# Patient Record
Sex: MALE | Race: WHITE | NOT HISPANIC OR LATINO | Employment: UNEMPLOYED | URBAN - METROPOLITAN AREA
[De-identification: names, ages, dates, MRNs, and addresses within clinical notes are randomized per-mention and may not be internally consistent; named-entity substitution may affect disease eponyms.]

---

## 2018-07-26 ENCOUNTER — HOSPITAL ENCOUNTER (EMERGENCY)
Facility: HOSPITAL | Age: 43
Discharge: HOME | End: 2018-07-26
Attending: EMERGENCY MEDICINE
Payer: COMMERCIAL

## 2018-07-26 VITALS
OXYGEN SATURATION: 96 % | RESPIRATION RATE: 17 BRPM | SYSTOLIC BLOOD PRESSURE: 108 MMHG | HEIGHT: 69 IN | DIASTOLIC BLOOD PRESSURE: 78 MMHG | BODY MASS INDEX: 26.66 KG/M2 | WEIGHT: 180 LBS | TEMPERATURE: 99 F | HEART RATE: 85 BPM

## 2018-07-26 DIAGNOSIS — S82.832A CLOSED AVULSION FRACTURE OF DISTAL END OF LEFT FIBULA, INITIAL ENCOUNTER: Primary | ICD-10-CM

## 2018-07-26 DIAGNOSIS — M25.572 LEFT ANKLE PAIN: ICD-10-CM

## 2018-07-26 PROCEDURE — 99283 EMERGENCY DEPT VISIT LOW MDM: CPT | Mod: 25

## 2018-07-26 PROCEDURE — 29515 APPLICATION SHORT LEG SPLINT: CPT | Mod: LT

## 2018-07-26 PROCEDURE — 99283 EMERGENCY DEPT VISIT LOW MDM: CPT | Mod: ,,, | Performed by: EMERGENCY MEDICINE

## 2018-07-27 NOTE — ED NOTES
Pt alert, oriented, and stable for discharge. Pt discharged with family as transportation home. Discharge instructions and follow up care reviewed with patient.

## 2018-07-27 NOTE — ED PROVIDER NOTES
"Encounter Date: 7/26/2018    SCRIBE #1 NOTE: I, Lala Clay, am scribing for, and in the presence of,  Dr. Oquendo. I have scribed the following portions of the note - Other sections scribed: HPI, ROS, PE.       History     Chief Complaint   Patient presents with    Ankle Pain     Pt reports tripping and "rolling" left ankle. Swelling noted to left ankle.     Time patient was seen by the provider: 8:59 PM      The patient is a 43 y.o. male who presents to the ED with a complaint of left ankle pain after he rolled his ankle while jogging with his dog earlier today. This is an inversion type injury. Patient states pain worsens with movement. There is swelling noted to his ankle. Denies taking anything for the pain.         The history is provided by the patient and medical records.     Review of patient's allergies indicates:  No Known Allergies  No past medical history on file.  No past surgical history on file.  No family history on file.  Social History   Substance Use Topics    Smoking status: Not on file    Smokeless tobacco: Not on file    Alcohol use Not on file     Review of Systems   Constitutional: Negative.    HENT: Negative.    Eyes: Negative.    Respiratory: Negative.    Cardiovascular: Negative.    Gastrointestinal: Negative.    Endocrine: Negative.    Genitourinary: Negative.    Musculoskeletal: Positive for joint swelling (left ankle).        (+) Left ankle pain    Allergic/Immunologic: Negative.    Neurological: Negative.    Hematological: Negative.    Psychiatric/Behavioral: Negative.    All other systems reviewed and are negative.      Physical Exam     Initial Vitals [07/26/18 2044]   BP Pulse Resp Temp SpO2   108/78 85 17 98.6 °F (37 °C) 96 %      MAP       --         Physical Exam    Nursing note and vitals reviewed.  Constitutional: He appears well-developed and well-nourished. No distress.   HENT:   Head: Normocephalic and atraumatic.   Eyes: Conjunctivae are normal.   Neck: Normal " range of motion. Neck supple.   Cardiovascular: Normal rate, regular rhythm and normal heart sounds.   Pulmonary/Chest: Breath sounds normal. No respiratory distress.   Abdominal: Soft. Bowel sounds are normal. He exhibits no distension. There is no tenderness.   Musculoskeletal: Normal range of motion.   Tenderness to left distal fibula. Mild edema over the anterior fibular region. No fibular head tenderness.    Neurological: He is alert and oriented to person, place, and time. He has normal strength. No sensory deficit.   Skin: Skin is warm and dry. Capillary refill takes less than 2 seconds.   Psychiatric: He has a normal mood and affect.         ED Course   Procedures  Labs Reviewed - No data to display       Imaging Results          X-Ray Ankle Complete Left (Final result)  Result time 07/26/18 22:22:21    Final result by Jose Lang MD (07/26/18 22:22:21)                 Impression:      Small avulsion fracture at the lateral aspect of the talus/inferior aspect of the lateral malleolus with overlying soft tissue swelling.      Electronically signed by: Jose Lang MD  Date:    07/26/2018  Time:    22:22             Narrative:    EXAMINATION:  XR ANKLE COMPLETE 3 VIEW LEFT    CLINICAL HISTORY:  Pain in left ankle and joints of left foot    TECHNIQUE:  Three views of the left ankle.    COMPARISON:  None.    FINDINGS:  There is a small bone fragment at the lateral aspect of the talus/inferior aspect of the lateral malleolus, likely an avulsion fracture.  No additional fractures are identified.  Ankle mortise is intact.  Moderate soft tissue swelling about the ankle, more pronounced over the lateral malleolus.  No radiopaque foreign body.                                 Medical Decision Making:   History:   Old Medical Records: I decided to obtain old medical records.  Clinical Tests:   Radiological Study: Ordered and Reviewed  ED Management:  Will refer to ortho clinic and apply walking boot. Small  avulsion fx. Declined motrin or pain meds.     I, Dr. Jesus Oquendo, personally performed the services described in this documentation. All medical record entries made by the scribe were at my direction and in my presence.  I have reviewed the chart and agree that the record reflects my personal performance and is accurate and complete. Jesus Oquendo MD.  10:29 PM 07/26/2018              Scribe Attestation:   Scribe #1: I performed the above scribed service and the documentation accurately describes the services I performed. I attest to the accuracy of the note.               Clinical Impression:   The primary encounter diagnosis was Closed avulsion fracture of distal end of left fibula, initial encounter. A diagnosis of Left ankle pain was also pertinent to this visit.      Disposition:   Disposition: Discharged  Condition: Stable                        Jesus Oquendo MD  07/26/18 2230       Jesus Oquendo MD  07/26/18 2230

## 2018-07-27 NOTE — ED NOTES
"Chief Complaint   Patient presents with    Ankle Pain     Pt reports tripping and "rolling" left ankle. Swelling noted to left ankle.     Pt presents to ED with c/o left ankle pain after running with his dog and stepped on a "magnolia seed." Pt has no visible deformity, but has visible swelling to left ankle. Pt has +ROM, but c/o pain with movement. No discoloration present. Pt denies taking anything for pain PTA and states "and I probably won't."     Active Ambulatory Problems     Diagnosis Date Noted    No Active Ambulatory Problems     Resolved Ambulatory Problems     Diagnosis Date Noted    No Resolved Ambulatory Problems     No Additional Past Medical History     Review of patient's allergies indicates:  No Known Allergies  "

## 2022-01-20 ENCOUNTER — HOSPITAL ENCOUNTER (OUTPATIENT)
Facility: HOSPITAL | Age: 47
Discharge: HOME | End: 2022-01-22
Attending: EMERGENCY MEDICINE | Admitting: STUDENT IN AN ORGANIZED HEALTH CARE EDUCATION/TRAINING PROGRAM
Payer: MEDICAID

## 2022-01-20 DIAGNOSIS — L98.9 SKIN LESION: ICD-10-CM

## 2022-01-20 DIAGNOSIS — R07.9 CHEST PAIN: ICD-10-CM

## 2022-01-20 DIAGNOSIS — R73.9 HYPERGLYCEMIA: ICD-10-CM

## 2022-01-20 DIAGNOSIS — L30.8 OTHER ECZEMA: ICD-10-CM

## 2022-01-20 DIAGNOSIS — I88.9 LYMPHADENITIS: Primary | ICD-10-CM

## 2022-01-20 PROBLEM — L03.113 CELLULITIS OF RIGHT UPPER EXTREMITY: Status: ACTIVE | Noted: 2022-01-20

## 2022-01-20 LAB
BASOPHILS # BLD AUTO: 0.06 K/UL (ref 0–0.2)
BASOPHILS NFR BLD: 1.4 % (ref 0–1.9)
BUN SERPL-MCNC: 14 MG/DL (ref 6–30)
CHLORIDE SERPL-SCNC: 102 MMOL/L (ref 95–110)
CREAT SERPL-MCNC: 1 MG/DL (ref 0.5–1.4)
CTP QC/QA: YES
DIFFERENTIAL METHOD: NORMAL
EOSINOPHIL # BLD AUTO: 0.1 K/UL (ref 0–0.5)
EOSINOPHIL NFR BLD: 1.4 % (ref 0–8)
ERYTHROCYTE [DISTWIDTH] IN BLOOD BY AUTOMATED COUNT: 12.9 % (ref 11.5–14.5)
ERYTHROCYTE [SEDIMENTATION RATE] IN BLOOD BY WESTERGREN METHOD: 4 MM/HR (ref 0–23)
GLUCOSE SERPL-MCNC: 110 MG/DL (ref 70–110)
HCT VFR BLD AUTO: 47.7 % (ref 40–54)
HCT VFR BLD CALC: 47 %PCV (ref 36–54)
HGB BLD-MCNC: 15.8 G/DL (ref 14–18)
IMM GRANULOCYTES # BLD AUTO: 0.02 K/UL (ref 0–0.04)
IMM GRANULOCYTES NFR BLD AUTO: 0.5 % (ref 0–0.5)
LACTATE SERPL-SCNC: 0.7 MMOL/L (ref 0.5–2.2)
LYMPHOCYTES # BLD AUTO: 1.5 K/UL (ref 1–4.8)
LYMPHOCYTES NFR BLD: 34.7 % (ref 18–48)
MCH RBC QN AUTO: 29.5 PG (ref 27–31)
MCHC RBC AUTO-ENTMCNC: 33.1 G/DL (ref 32–36)
MCV RBC AUTO: 89 FL (ref 82–98)
MONOCYTES # BLD AUTO: 0.5 K/UL (ref 0.3–1)
MONOCYTES NFR BLD: 11.7 % (ref 4–15)
NEUTROPHILS # BLD AUTO: 2.2 K/UL (ref 1.8–7.7)
NEUTROPHILS NFR BLD: 50.3 % (ref 38–73)
NRBC BLD-RTO: 0 /100 WBC
PLATELET # BLD AUTO: 209 K/UL (ref 150–450)
PMV BLD AUTO: 11.3 FL (ref 9.2–12.9)
POC IONIZED CALCIUM: 1.16 MMOL/L (ref 1.06–1.42)
POC TCO2 (MEASURED): 25 MMOL/L (ref 23–29)
POTASSIUM BLD-SCNC: 3.7 MMOL/L (ref 3.5–5.1)
RBC # BLD AUTO: 5.36 M/UL (ref 4.6–6.2)
SAMPLE: NORMAL
SARS-COV-2 RDRP RESP QL NAA+PROBE: NEGATIVE
SODIUM BLD-SCNC: 140 MMOL/L (ref 136–145)
WBC # BLD AUTO: 4.27 K/UL (ref 3.9–12.7)

## 2022-01-20 PROCEDURE — 80047 BASIC METABLC PNL IONIZED CA: CPT

## 2022-01-20 PROCEDURE — 96375 TX/PRO/DX INJ NEW DRUG ADDON: CPT

## 2022-01-20 PROCEDURE — 85652 RBC SED RATE AUTOMATED: CPT | Performed by: PHYSICIAN ASSISTANT

## 2022-01-20 PROCEDURE — U0002 COVID-19 LAB TEST NON-CDC: HCPCS | Performed by: PHYSICIAN ASSISTANT

## 2022-01-20 PROCEDURE — 86140 C-REACTIVE PROTEIN: CPT | Performed by: PHYSICIAN ASSISTANT

## 2022-01-20 PROCEDURE — 83605 ASSAY OF LACTIC ACID: CPT | Performed by: PHYSICIAN ASSISTANT

## 2022-01-20 PROCEDURE — 99285 EMERGENCY DEPT VISIT HI MDM: CPT | Mod: 25

## 2022-01-20 PROCEDURE — 99285 EMERGENCY DEPT VISIT HI MDM: CPT | Mod: CS,,, | Performed by: PHYSICIAN ASSISTANT

## 2022-01-20 PROCEDURE — G0378 HOSPITAL OBSERVATION PER HR: HCPCS

## 2022-01-20 PROCEDURE — 63600175 PHARM REV CODE 636 W HCPCS: Performed by: PHYSICIAN ASSISTANT

## 2022-01-20 PROCEDURE — 96365 THER/PROPH/DIAG IV INF INIT: CPT

## 2022-01-20 PROCEDURE — 85025 COMPLETE CBC W/AUTO DIFF WBC: CPT | Performed by: PHYSICIAN ASSISTANT

## 2022-01-20 PROCEDURE — 87040 BLOOD CULTURE FOR BACTERIA: CPT | Mod: 59 | Performed by: PHYSICIAN ASSISTANT

## 2022-01-20 PROCEDURE — 99285 PR EMERGENCY DEPT VISIT,LEVEL V: ICD-10-PCS | Mod: CS,,, | Performed by: PHYSICIAN ASSISTANT

## 2022-01-20 PROCEDURE — 96367 TX/PROPH/DG ADDL SEQ IV INF: CPT

## 2022-01-20 PROCEDURE — 25000003 PHARM REV CODE 250: Performed by: PHYSICIAN ASSISTANT

## 2022-01-20 PROCEDURE — 80053 COMPREHEN METABOLIC PANEL: CPT | Performed by: PHYSICIAN ASSISTANT

## 2022-01-20 PROCEDURE — 83036 HEMOGLOBIN GLYCOSYLATED A1C: CPT | Performed by: PHYSICIAN ASSISTANT

## 2022-01-20 RX ORDER — NALOXONE HCL 0.4 MG/ML
0.02 VIAL (ML) INJECTION
Status: DISCONTINUED | OUTPATIENT
Start: 2022-01-21 | End: 2022-01-22 | Stop reason: HOSPADM

## 2022-01-20 RX ORDER — ENOXAPARIN SODIUM 100 MG/ML
40 INJECTION SUBCUTANEOUS EVERY 24 HOURS
Status: DISCONTINUED | OUTPATIENT
Start: 2022-01-21 | End: 2022-01-22 | Stop reason: HOSPADM

## 2022-01-20 RX ORDER — GLUCAGON 1 MG
1 KIT INJECTION
Status: DISCONTINUED | OUTPATIENT
Start: 2022-01-21 | End: 2022-01-22 | Stop reason: HOSPADM

## 2022-01-20 RX ORDER — ACETAMINOPHEN 325 MG/1
650 TABLET ORAL EVERY 6 HOURS PRN
Status: DISCONTINUED | OUTPATIENT
Start: 2022-01-21 | End: 2022-01-22 | Stop reason: HOSPADM

## 2022-01-20 RX ORDER — TALC
6 POWDER (GRAM) TOPICAL NIGHTLY PRN
Status: DISCONTINUED | OUTPATIENT
Start: 2022-01-21 | End: 2022-01-22 | Stop reason: HOSPADM

## 2022-01-20 RX ORDER — KETOROLAC TROMETHAMINE 30 MG/ML
15 INJECTION, SOLUTION INTRAMUSCULAR; INTRAVENOUS EVERY 6 HOURS PRN
Status: DISCONTINUED | OUTPATIENT
Start: 2022-01-21 | End: 2022-01-22 | Stop reason: HOSPADM

## 2022-01-20 RX ORDER — HYDROCODONE BITARTRATE AND ACETAMINOPHEN 5; 325 MG/1; MG/1
1 TABLET ORAL EVERY 6 HOURS PRN
Status: DISCONTINUED | OUTPATIENT
Start: 2022-01-21 | End: 2022-01-20

## 2022-01-20 RX ORDER — IBUPROFEN 200 MG
24 TABLET ORAL
Status: DISCONTINUED | OUTPATIENT
Start: 2022-01-21 | End: 2022-01-22 | Stop reason: HOSPADM

## 2022-01-20 RX ORDER — ONDANSETRON 2 MG/ML
4 INJECTION INTRAMUSCULAR; INTRAVENOUS EVERY 8 HOURS PRN
Status: DISCONTINUED | OUTPATIENT
Start: 2022-01-21 | End: 2022-01-22 | Stop reason: HOSPADM

## 2022-01-20 RX ORDER — AMOXICILLIN 250 MG
1 CAPSULE ORAL DAILY
Status: DISCONTINUED | OUTPATIENT
Start: 2022-01-21 | End: 2022-01-22 | Stop reason: HOSPADM

## 2022-01-20 RX ORDER — SODIUM CHLORIDE 0.9 % (FLUSH) 0.9 %
10 SYRINGE (ML) INJECTION EVERY 12 HOURS PRN
Status: DISCONTINUED | OUTPATIENT
Start: 2022-01-21 | End: 2022-01-22 | Stop reason: HOSPADM

## 2022-01-20 RX ORDER — DIPHENHYDRAMINE HYDROCHLORIDE 50 MG/ML
25 INJECTION INTRAMUSCULAR; INTRAVENOUS
Status: COMPLETED | OUTPATIENT
Start: 2022-01-20 | End: 2022-01-20

## 2022-01-20 RX ORDER — IBUPROFEN 200 MG
16 TABLET ORAL
Status: DISCONTINUED | OUTPATIENT
Start: 2022-01-21 | End: 2022-01-22 | Stop reason: HOSPADM

## 2022-01-20 RX ADMIN — PIPERACILLIN AND TAZOBACTAM 4.5 G: 4; .5 INJECTION, POWDER, LYOPHILIZED, FOR SOLUTION INTRAVENOUS; PARENTERAL at 09:01

## 2022-01-20 RX ADMIN — DIPHENHYDRAMINE HYDROCHLORIDE 25 MG: 50 INJECTION INTRAMUSCULAR; INTRAVENOUS at 10:01

## 2022-01-20 RX ADMIN — VANCOMYCIN HYDROCHLORIDE 2000 MG: 500 INJECTION, POWDER, LYOPHILIZED, FOR SOLUTION INTRAVENOUS at 10:01

## 2022-01-21 PROBLEM — E66.9 CLASS 1 OBESITY IN ADULT: Status: ACTIVE | Noted: 2022-01-21

## 2022-01-21 PROBLEM — E66.811 CLASS 1 OBESITY IN ADULT: Status: ACTIVE | Noted: 2022-01-21

## 2022-01-21 PROBLEM — R73.9 HYPERGLYCEMIA: Status: ACTIVE | Noted: 2022-01-21

## 2022-01-21 LAB
ALBUMIN SERPL BCP-MCNC: 3.8 G/DL (ref 3.5–5.2)
ALP SERPL-CCNC: 61 U/L (ref 55–135)
ALT SERPL W/O P-5'-P-CCNC: 38 U/L (ref 10–44)
ANION GAP SERPL CALC-SCNC: 8 MMOL/L (ref 8–16)
AST SERPL-CCNC: 19 U/L (ref 10–40)
BILIRUB SERPL-MCNC: 0.6 MG/DL (ref 0.1–1)
BUN SERPL-MCNC: 13 MG/DL (ref 6–20)
CALCIUM SERPL-MCNC: 8.4 MG/DL (ref 8.7–10.5)
CHLORIDE SERPL-SCNC: 105 MMOL/L (ref 95–110)
CO2 SERPL-SCNC: 22 MMOL/L (ref 23–29)
CREAT SERPL-MCNC: 1.2 MG/DL (ref 0.5–1.4)
CRP SERPL-MCNC: 17.9 MG/L (ref 0–8.2)
EST. GFR  (AFRICAN AMERICAN): >60 ML/MIN/1.73 M^2
EST. GFR  (NON AFRICAN AMERICAN): >60 ML/MIN/1.73 M^2
ESTIMATED AVG GLUCOSE: 108 MG/DL (ref 68–131)
GLUCOSE SERPL-MCNC: 153 MG/DL (ref 70–110)
HBA1C MFR BLD: 5.4 % (ref 4–5.6)
POTASSIUM SERPL-SCNC: 3.8 MMOL/L (ref 3.5–5.1)
PROT SERPL-MCNC: 6.6 G/DL (ref 6–8.4)
SODIUM SERPL-SCNC: 135 MMOL/L (ref 136–145)

## 2022-01-21 PROCEDURE — 25000003 PHARM REV CODE 250: Performed by: STUDENT IN AN ORGANIZED HEALTH CARE EDUCATION/TRAINING PROGRAM

## 2022-01-21 PROCEDURE — 96366 THER/PROPH/DIAG IV INF ADDON: CPT

## 2022-01-21 PROCEDURE — 99218 PR INITIAL OBSERVATION CARE,LEVL I: ICD-10-PCS | Mod: ,,, | Performed by: NURSE PRACTITIONER

## 2022-01-21 PROCEDURE — 96367 TX/PROPH/DG ADDL SEQ IV INF: CPT

## 2022-01-21 PROCEDURE — G0378 HOSPITAL OBSERVATION PER HR: HCPCS

## 2022-01-21 PROCEDURE — 99218 PR INITIAL OBSERVATION CARE,LEVL I: CPT | Mod: ,,, | Performed by: NURSE PRACTITIONER

## 2022-01-21 PROCEDURE — 25000003 PHARM REV CODE 250: Performed by: NURSE PRACTITIONER

## 2022-01-21 RX ORDER — DIPHENHYDRAMINE HCL 25 MG
25 CAPSULE ORAL EVERY 6 HOURS PRN
Status: DISCONTINUED | OUTPATIENT
Start: 2022-01-21 | End: 2022-01-22 | Stop reason: HOSPADM

## 2022-01-21 RX ORDER — CLINDAMYCIN HYDROCHLORIDE 150 MG/1
300 CAPSULE ORAL EVERY 6 HOURS
Status: DISCONTINUED | OUTPATIENT
Start: 2022-01-21 | End: 2022-01-22 | Stop reason: HOSPADM

## 2022-01-21 RX ORDER — CLINDAMYCIN PHOSPHATE 600 MG/50ML
600 INJECTION, SOLUTION INTRAVENOUS
Status: DISCONTINUED | OUTPATIENT
Start: 2022-01-21 | End: 2022-01-21

## 2022-01-21 RX ADMIN — CLINDAMYCIN IN 5 PERCENT DEXTROSE 600 MG: 12 INJECTION, SOLUTION INTRAVENOUS at 01:01

## 2022-01-21 RX ADMIN — CLINDAMYCIN IN 5 PERCENT DEXTROSE 600 MG: 12 INJECTION, SOLUTION INTRAVENOUS at 10:01

## 2022-01-21 RX ADMIN — CLINDAMYCIN HYDROCHLORIDE 300 MG: 150 CAPSULE ORAL at 11:01

## 2022-01-21 RX ADMIN — CLINDAMYCIN HYDROCHLORIDE 300 MG: 150 CAPSULE ORAL at 05:01

## 2022-01-21 NOTE — PROGRESS NOTES
Therapy with Vancomycin consult has been discontinued by provider.  Pharmacy will sign off, please re-consult as needed.

## 2022-01-21 NOTE — ED NOTES
Pt denies nausea, heart racing, and dizziness now. Zosyn completed and started Vanc. Pt made aware to alert this RN of any adverse reactions. Pt verbalized understanding.

## 2022-01-21 NOTE — ED PROVIDER NOTES
Encounter Date: 1/20/2022       History     Chief Complaint   Patient presents with    Arm Pain     Right arm pain with red streak going up arm, reports scrathing his arm and it got infected     This is a 46 year old male with no known PMH presenting to the ED with a chief complaint of rash. Patient reports a lesion to the dorsal right forearm that he scratched. It became significantly swollen, erythematous and tender. He notes purulent drainage from the lesion. This lesion improved but he then developed lymphangitic streaking from the hand into the right axilla. He is unsure of his tetanus status. He denies fever, chills, myalgias, nausea/vomiting, decreased ROM, focal weakness or numbness. Additionally he reports mild sore throat. He is not vaccinated for covid.        Review of patient's allergies indicates:  No Known Allergies  No past medical history on file.  No past surgical history on file.  No family history on file.     Review of Systems   Constitutional: Negative for chills and fever.   HENT: Positive for sore throat.    Respiratory: Negative for cough and shortness of breath.    Cardiovascular: Negative for chest pain.   Gastrointestinal: Negative for nausea and vomiting.   Musculoskeletal: Negative for myalgias.   Skin: Positive for color change and rash.   Neurological: Negative for weakness and numbness.   Hematological: Does not bruise/bleed easily.       Physical Exam     Initial Vitals [01/20/22 1829]   BP Pulse Resp Temp SpO2   (!) 154/86 87 18 99.8 °F (37.7 °C) 98 %      MAP       --         Physical Exam    Constitutional: He appears well-developed and well-nourished. No distress.   HENT:   Head: Atraumatic.   Eyes: Conjunctivae and EOM are normal. Pupils are equal, round, and reactive to light.   Cardiovascular: Normal rate, regular rhythm and normal heart sounds.   Pulmonary/Chest: Breath sounds normal. No respiratory distress. He has no wheezes. He has no rhonchi. He has no rales.      Neurological: He is alert and oriented to person, place, and time.   Skin: Skin is warm and dry. Rash noted. There is erythema.   Nodular lesion of the right forearm. Nontender to palpation. No abscess.    Eczematous lesion of right thumb.    Lymph streaking of right arm.                      ED Course   Procedures  Labs Reviewed   CULTURE, BLOOD   CULTURE, BLOOD   LACTIC ACID, PLASMA   CBC W/ AUTO DIFFERENTIAL   COMPREHENSIVE METABOLIC PANEL   SEDIMENTATION RATE   C-REACTIVE PROTEIN   SARS-COV-2 RDRP GENE    Narrative:     This test utilizes isothermal nucleic acid amplification   technology to detect the SARS-CoV-2 RdRp nucleic acid segment.   The analytical sensitivity (limit of detection) is 125 genome   equivalents/mL.   A POSITIVE result implies infection with the SARS-CoV-2 virus;   the patient is presumed to be contagious.     A NEGATIVE result means that SARS-CoV-2 nucleic acids are not   present above the limit of detection. A NEGATIVE result should be   treated as presumptive. It does not rule out the possibility of   COVID-19 and should not be the sole basis for treatment decisions.   If COVID-19 is strongly suspected based on clinical and exposure   history, re-testing using an alternate molecular assay should be   considered.   This test is only for use under the Food and Drug   Administration s Emergency Use Authorization (EUA).   Commercial kits are provided by [employer].   Performance characteristics of the EUA have been independently   verified by [facility] Department of   Pathology and Laboratory Medicine.   _________________________________________________________________   The authorized Fact Sheet for Healthcare Providers and the authorized Fact   Sheet for Patients of the ID NOW COVID-19 are available on the FDA   website:     https://www.fda.gov/media/010776/download  https://www.fda.gov/media/945761/download         ISTAT PROCEDURE   ISTAT CHEM8          Imaging  Results          X-Ray Hand 3 view Right (Final result)  Result time 01/20/22 22:20:27    Final result by Scottie Randolph MD (01/20/22 22:20:27)                 Impression:      No obvious evidence of an acute injury or process involving the right hand.      Electronically signed by: Scottie Randolph  Date:    01/20/2022  Time:    22:20             Narrative:    EXAMINATION:  XR HAND COMPLETE 3 VIEW RIGHT    CLINICAL HISTORY:  streaking (wound on thumb and right above wrist if you can extend film for soft tissue eval);    TECHNIQUE:  PA, lateral, and oblique views of the right hand were performed.    COMPARISON:  None    FINDINGS:  Multiple views of the right hand indicates no apparent evidence of an acute fracture injury of the phalanges the metacarpal or carpal bones.  The articular surfaces are smooth.  No disruption of the cortices.  No indication of soft tissue swelling or foreign body.                                 Medications   vancomycin 2 g in dextrose 5 % 500 mL IVPB (2,000 mg Intravenous New Bag 1/20/22 2247)   piperacillin-tazobactam 4.5 g in sodium chloride 0.9% 100 mL IVPB (ready to mix system) (0 g Intravenous Stopped 1/20/22 2246)   diphenhydrAMINE injection 25 mg (25 mg Intravenous Given 1/20/22 2217)     Medical Decision Making:   Clinical Tests:   Lab Tests: Ordered and Reviewed  Radiological Study: Ordered and Reviewed  Medical Tests: Ordered and Reviewed  Other:   I have discussed this case with another health care provider.       APC / Resident Notes:   46 y.o. year old male presenting with lymphangitic streaking.    DDx includes but is not limited to lymphangitis, cellulitis, abscess, bacteremia.    Broad spectrum antibiotics given. Will admit pending cultures. Patient declined tetanus vaccination. I discussed the care of this patient with my supervising physician.                   Clinical Impression:   Final diagnoses:  [L30.8] Other eczema  [L98.9] Skin lesion  [I88.9] Lymphadenitis  (Primary)          ED Disposition Condition    Observation               Debora Yusuf PA-C  01/20/22 8937

## 2022-01-21 NOTE — ASSESSMENT & PLAN NOTE
No history of diabetes per patient. Glucose of 153 on CMP.  -Check hemoglobin A1C and add low dose SSI if indicated

## 2022-01-21 NOTE — ED NOTES
I-STAT Chem-8+ Results:   Value Reference Range   Sodium 140 136-145 mmol/L   Potassium  3.7 3.5-5.1 mmol/L   Chloride 102  mmol/L   Ionized Calcium 1.16 1.06-1.42 mmol/L   CO2 (measured) 25 23-29 mmol/L   Glucose 110  mg/dL   BUN 14 6-30 mg/dL   Creatinine 1.0 0.5-1.4 mg/dL   Hematocrit 47 36-54%

## 2022-01-21 NOTE — ED NOTES
Pt face has become hot, red, and warm to the touch. Pt c/o of feeling flushed. Paused vanc and paged HMS.

## 2022-01-21 NOTE — ED TRIAGE NOTES
Pt presents to the ED via self for red streaking up his up his arm that started last night. He states he picked at a scab and it got infected. The infection has since subsided but the red streaking just started. Denies fevers/chills.

## 2022-01-21 NOTE — ASSESSMENT & PLAN NOTE
Body mass index is 32.49 kg/m². Obesity complicates all aspects of disease management from diagnostic modalities to treatment.

## 2022-01-21 NOTE — ED NOTES
Pt c/o of itching on lower back, scalp, and neck. Pt has few hives on his back.Zosyn stopped and MD informed. Md ordered benadryl

## 2022-01-21 NOTE — ASSESSMENT & PLAN NOTE
The patient reports a lesion on right arm that he scratched and became infected about 1.5-2 weeks ago. He reports the lesion became very red, swollen, and painful. He noted purulent discharge but states after 2-3 days it improved. He reports this morning he noted a red streak from his right inner wrist to his armpit. He denies any associated fever, chills, nausea, vomiting, or diarrhea. Afebrile and no leukocytosis on labs    -Xrays of right hand- No obvious evidence of an acute injury or process involving the right hand.  -Patient received vancomycin and zosyn in the ED, nursing reported itching with zosyn and redness with vancomycin. Changed to clindamycin for now  -Blood cx in process  -Lactate WNL  -ESR and CRP in process

## 2022-01-21 NOTE — PHARMACY MED REC
"Admission Medication History     The home medication history was taken by Bipin Arellano.    You may go to "Admission" then "Reconcile Home Medications" tabs to review and/or act upon these items.      The home medication list has been updated by the Pharmacy department.    Please read ALL comments highlighted in yellow.    Please address this information as you see fit.     Feel free to contact us if you have any questions or require assistance.      Medications listed below were obtained from: Patient/family     PTA Medications   Medication Sig    docosahexaenoic acid/epa (EPA-DHA ORAL) Take 1 capsule by mouth as needed as a dietary supplement    mecobalamin (B12 ACTIVE ORAL) Take by mouth 1-2 times per week as needed.       Bipin Arellano, [facility]  EXT [id number]                  .          "

## 2022-01-21 NOTE — H&P
[facility] - Emergency Dept  [facility]  History & Physical    Patient Name: Fredrick Cruz III  MRN: [id number]  Patient Class: OP- Observation  Admission Date: 1/20/2022  Attending Physician: Karthikeyan Moffett MD   Primary Care Provider: Primary Doctor No         Patient information was obtained from patient, past medical records and ER records.     Subjective:     Principal Problem:Cellulitis of right upper extremity    Chief Complaint:   Chief Complaint   Patient presents with    Arm Pain     Right arm pain with red streak going up arm, reports scrathing his arm and it got infected        HPI: Fredrick Cruz III is a 46 y.o. male with a PMHx of eczema and fatty liver disease who presents the ED with complaints of right arm redness. The patient reports a lesion on his right arm that he scratched and became infected about 1.5-2 weeks ago. He reports the lesion became very red, swollen, and painful. He noted purulent discharge but states after 2-3 days his symptoms improved. This morning he noted a red streak from his right inner wrist to his armpit. He denies any associated fever, chills, nausea, vomiting, or diarrhea. He denies any previous history of any skin infections. The patient notes a sore throat a few days ago which has since resolved but still has a mild non productive cough that began a few days ago. The patient denies any chest pain, abdominal pain, headache, numbness/tingling, or dysuria.    In the ED, VSSAF. CBC unremarkable.  Lactic acid WNL.CMP with mild hyperglycemia. CRP and ESR in process. Blood cxs in process. Xrays of right hand with no obvious evidence of an acute injury or process involving the right hand. The patient received zosyn and vancomycin in the ED, noted to have itching with zosyn so was also given benadryl.       Past Medical History:   Diagnosis Date    Eczema        History reviewed. No pertinent surgical history.    Review of patient's allergies indicates:    Allergen Reactions    Zosyn [piperacillin-tazobactam] Itching       No current facility-administered medications on file prior to encounter.     No current outpatient medications on file prior to encounter.     Family History     Problem Relation (Age of Onset)    COPD Mother    Diabetes Father    Hepatitis Father    Hypertension Mother        Tobacco Use    Smoking status: Former Smoker     Packs/day: 1.50     Years: 20.00     Pack years: 30.00     Types: Cigarettes     Quit date:      Years since quittin.0    Smokeless tobacco: Never Used   Substance and Sexual Activity    Alcohol use: Not on file    Drug use: Not on file    Sexual activity: Not on file     Review of Systems   Constitutional: Negative for appetite change, chills, diaphoresis, fatigue and fever.   HENT: Positive for sore throat (resolved). Negative for congestion, rhinorrhea, sinus pressure and sneezing.    Respiratory: Positive for cough (mild, non productive).    Cardiovascular: Negative for chest pain, palpitations and leg swelling.   Gastrointestinal: Negative for abdominal distention, abdominal pain, diarrhea, nausea and vomiting.   Genitourinary: Negative for dysuria and frequency.   Musculoskeletal: Negative for arthralgias, back pain, gait problem, joint swelling and myalgias.   Skin: Positive for color change and wound. Negative for pallor and rash.   Neurological: Negative for dizziness, weakness, light-headedness, numbness and headaches.   Psychiatric/Behavioral: Negative for confusion. The patient is not nervous/anxious.      Objective:     Vital Signs (Most Recent):  Temp: 98.1 °F (36.7 °C) (22)  Pulse: 81 (22)  Resp: 18 (22 1829)  BP: 122/75 (22)  SpO2: 97 % (22) Vital Signs (24h Range):  Temp:  [98.1 °F (36.7 °C)-99.8 °F (37.7 °C)] 98.1 °F (36.7 °C)  Pulse:  [81-87] 81  Resp:  [18] 18  SpO2:  [97 %-98 %] 97 %  BP: (122-154)/(75-86) 122/75     Weight: 99.8 kg (220  lb)  Body mass index is 32.49 kg/m².    Physical Exam  Vitals and nursing note reviewed.   Constitutional:       General: He is not in acute distress.     Appearance: He is obese. He is not ill-appearing, toxic-appearing or diaphoretic.   HENT:      Head: Normocephalic and atraumatic.      Nose: Nose normal.      Mouth/Throat:      Mouth: Mucous membranes are moist.   Eyes:      Pupils: Pupils are equal, round, and reactive to light.   Cardiovascular:      Rate and Rhythm: Normal rate and regular rhythm.      Heart sounds: No murmur heard.      Pulmonary:      Effort: Pulmonary effort is normal. No respiratory distress.      Breath sounds: No wheezing, rhonchi or rales.      Comments: Currently on room air  Abdominal:      General: Bowel sounds are normal. There is no distension.      Palpations: Abdomen is soft.      Tenderness: There is no abdominal tenderness. There is no guarding.   Genitourinary:     Comments: deferred  Musculoskeletal:         General: No swelling, tenderness or deformity. Normal range of motion.      Cervical back: Normal range of motion.   Skin:     General: Skin is warm and dry.      Capillary Refill: Capillary refill takes less than 2 seconds.      Findings: Erythema and lesion present.      Comments: Lesion of the right forearm with no surrounding erythema, no fluctuance, or warmth. Nontender to palpation. Eczematous lesion of right thumb. Red streak noted from right inner wrist up forearm to mid upper arm. No significant pain on palpation.   Neurological:      General: No focal deficit present.      Mental Status: He is alert and oriented to person, place, and time.      Sensory: No sensory deficit.      Motor: No weakness.   Psychiatric:         Mood and Affect: Mood normal.         Behavior: Behavior normal.           CRANIAL NERVES     CN III, IV, VI   Pupils are equal, round, and reactive to light.                   Significant Labs:   All pertinent labs within the past 24 hours have  been reviewed.  CBC:   Recent Labs   Lab 01/20/22 2114 01/20/22 2126   WBC 4.27  --    HGB 15.8  --    HCT 47.7 47     --      CMP: No results for input(s): NA, K, CL, CO2, GLU, BUN, CREATININE, CALCIUM, PROT, ALBUMIN, BILITOT, ALKPHOS, AST, ALT, ANIONGAP, EGFRNONAA in the last 48 hours.    Invalid input(s): ESTGFAFRICA  Lactic Acid:   Recent Labs   Lab 01/20/22 2114   LACTATE 0.7       Significant Imaging: I have reviewed all pertinent imaging results/findings within the past 24 hours.     Imaging Results          X-Ray Hand 3 view Right (Final result)  Result time 01/20/22 22:20:27    Final result by Scottie Randolph MD (01/20/22 22:20:27)                 Impression:      No obvious evidence of an acute injury or process involving the right hand.      Electronically signed by: Scottie Randolph  Date:    01/20/2022  Time:    22:20             Narrative:    EXAMINATION:  XR HAND COMPLETE 3 VIEW RIGHT    CLINICAL HISTORY:  streaking (wound on thumb and right above wrist if you can extend film for soft tissue eval);    TECHNIQUE:  PA, lateral, and oblique views of the right hand were performed.    COMPARISON:  None    FINDINGS:  Multiple views of the right hand indicates no apparent evidence of an acute fracture injury of the phalanges the metacarpal or carpal bones.  The articular surfaces are smooth.  No disruption of the cortices.  No indication of soft tissue swelling or foreign body.                                  Assessment/Plan:     * Cellulitis of right upper extremity  The patient reports a lesion on right arm that he scratched and became infected about 1.5-2 weeks ago. He reports the lesion became very red, swollen, and painful. He noted purulent discharge but states after 2-3 days it improved. He reports this morning he noted a red streak from his right inner wrist to his armpit. He denies any associated fever, chills, nausea, vomiting, or diarrhea. Afebrile and no leukocytosis on labs    -Xrays  of right hand- No obvious evidence of an acute injury or process involving the right hand.  -Patient received vancomycin and zosyn in the ED, nursing reported itching with zosyn and redness with vancomycin. Changed to clindamycin for now  -Blood cx in process  -Lactate WNL  -ESR and CRP in process    Hyperglycemia  No history of diabetes per patient. Glucose of 153 on CMP.  -Check hemoglobin A1C and add low dose SSI if indicated    Class 1 obesity in adult  Body mass index is 32.49 kg/m². Obesity complicates all aspects of disease management from diagnostic modalities to treatment.       VTE Risk Mitigation (From admission, onward)         Ordered     enoxaparin injection 40 mg  Daily         01/20/22 2326     IP VTE HIGH RISK PATIENT  Once         01/20/22 2326     Place sequential compression device  Until discontinued         01/20/22 2326                   Nelly Lares NP  Department of Hospital Medicine   [facility] - Emergency Dept

## 2022-01-21 NOTE — PROGRESS NOTES
Pharmacokinetic Initial Assessment & Plan: IV Vancomycin    IV Vancomycin 2 g was initiated in the ED @ 2246 on 1/20. Plan to continue Vancomycin 1500 mg every 12 hours. Draw a vancomycin trough 60 mins prior to the 4 th dose on 1/22 @ 0900.  Desired empiric serum trough concentration is 10 to 20 mcg/mL    Pharmacy will continue to follow and monitor vancomycin.    [id number] with any questions regarding this assessment.     Thank you for the consult,   Román Bagley       Patient brief summary:  Fredrick Cruz III is a 46 y.o. male initiated on antimicrobial therapy with IV Vancomycin for treatment of suspected skin & soft tissue infection    Drug Allergies:   Review of patient's allergies indicates:  No Known Allergies    Actual Body Weight:   99.8 kg    Renal Function:   Estimated Creatinine Clearance: 90.5 mL/min (based on SCr of 1.2 mg/dL).,     CBC (last 72 hours):  Recent Labs   Lab Result Units 01/20/22  2114   WBC K/uL 4.27   Hemoglobin g/dL 15.8   Hematocrit % 47.7   Platelets K/uL 209   Gran % % 50.3   Lymph % % 34.7   Mono % % 11.7   Eosinophil % % 1.4   Basophil % % 1.4   Differential Method  Automated       Metabolic Panel (last 72 hours):  Recent Labs   Lab Result Units 01/20/22  2326   Sodium mmol/L 135*   Potassium mmol/L 3.8   Chloride mmol/L 105   CO2 mmol/L 22*   Glucose mg/dL 153*   BUN mg/dL 13   Creatinine mg/dL 1.2   Albumin g/dL 3.8   Total Bilirubin mg/dL 0.6   Alkaline Phosphatase U/L 61   AST U/L 19   ALT U/L 38       Drug levels (last 3 results):  No results for input(s): VANCOMYCINRA, VANCOMYCINPE, VANCOMYCINTR in the last 72 hours.    Microbiologic Results:  Microbiology Results (last 7 days)     Procedure Component Value Units Date/Time    Blood culture #1 **CANNOT BE ORDERED STAT** [[id number]] Collected: 01/20/22 2100    Order Status: Sent Specimen: Blood from Peripheral, Antecubital, Left Updated: 01/20/22 2121    Blood culture #2 **CANNOT BE ORDERED STAT** [[id number]] Collected:  01/20/22 2115    Order Status: Sent Specimen: Blood from Peripheral, Antecubital, Left Updated: 01/20/22 2121

## 2022-01-22 VITALS
HEIGHT: 69 IN | WEIGHT: 220 LBS | BODY MASS INDEX: 32.58 KG/M2 | TEMPERATURE: 99 F | DIASTOLIC BLOOD PRESSURE: 77 MMHG | SYSTOLIC BLOOD PRESSURE: 125 MMHG | HEART RATE: 68 BPM | RESPIRATION RATE: 16 BRPM | OXYGEN SATURATION: 97 %

## 2022-01-22 LAB
ALBUMIN SERPL BCP-MCNC: 2.9 G/DL (ref 3.5–5.2)
ALP SERPL-CCNC: 48 U/L (ref 55–135)
ALT SERPL W/O P-5'-P-CCNC: 32 U/L (ref 10–44)
ANION GAP SERPL CALC-SCNC: 9 MMOL/L (ref 8–16)
AST SERPL-CCNC: 20 U/L (ref 10–40)
BASOPHILS # BLD AUTO: 0.04 K/UL (ref 0–0.2)
BASOPHILS NFR BLD: 1.2 % (ref 0–1.9)
BILIRUB SERPL-MCNC: 0.3 MG/DL (ref 0.1–1)
BUN SERPL-MCNC: 8 MG/DL (ref 6–20)
CA-I BLDV-SCNC: 1.08 MMOL/L (ref 1.06–1.42)
CALCIUM SERPL-MCNC: 6.6 MG/DL (ref 8.7–10.5)
CHLORIDE SERPL-SCNC: 115 MMOL/L (ref 95–110)
CO2 SERPL-SCNC: 16 MMOL/L (ref 23–29)
CREAT SERPL-MCNC: 0.8 MG/DL (ref 0.5–1.4)
DIFFERENTIAL METHOD: ABNORMAL
EOSINOPHIL # BLD AUTO: 0.1 K/UL (ref 0–0.5)
EOSINOPHIL NFR BLD: 2.9 % (ref 0–8)
ERYTHROCYTE [DISTWIDTH] IN BLOOD BY AUTOMATED COUNT: 12.8 % (ref 11.5–14.5)
EST. GFR  (AFRICAN AMERICAN): >60 ML/MIN/1.73 M^2
EST. GFR  (NON AFRICAN AMERICAN): >60 ML/MIN/1.73 M^2
GLUCOSE SERPL-MCNC: 92 MG/DL (ref 70–110)
HCT VFR BLD AUTO: 43.3 % (ref 40–54)
HGB BLD-MCNC: 13.8 G/DL (ref 14–18)
IMM GRANULOCYTES # BLD AUTO: 0.01 K/UL (ref 0–0.04)
IMM GRANULOCYTES NFR BLD AUTO: 0.3 % (ref 0–0.5)
LYMPHOCYTES # BLD AUTO: 1.3 K/UL (ref 1–4.8)
LYMPHOCYTES NFR BLD: 37.4 % (ref 18–48)
MCH RBC QN AUTO: 29 PG (ref 27–31)
MCHC RBC AUTO-ENTMCNC: 31.9 G/DL (ref 32–36)
MCV RBC AUTO: 91 FL (ref 82–98)
MONOCYTES # BLD AUTO: 0.4 K/UL (ref 0.3–1)
MONOCYTES NFR BLD: 12.8 % (ref 4–15)
NEUTROPHILS # BLD AUTO: 1.6 K/UL (ref 1.8–7.7)
NEUTROPHILS NFR BLD: 45.4 % (ref 38–73)
NRBC BLD-RTO: 0 /100 WBC
PLATELET # BLD AUTO: 155 K/UL (ref 150–450)
PMV BLD AUTO: 10.9 FL (ref 9.2–12.9)
POTASSIUM SERPL-SCNC: 3.3 MMOL/L (ref 3.5–5.1)
PROT SERPL-MCNC: 5.1 G/DL (ref 6–8.4)
RBC # BLD AUTO: 4.76 M/UL (ref 4.6–6.2)
SODIUM SERPL-SCNC: 140 MMOL/L (ref 136–145)
WBC # BLD AUTO: 3.45 K/UL (ref 3.9–12.7)

## 2022-01-22 PROCEDURE — 85025 COMPLETE CBC W/AUTO DIFF WBC: CPT | Performed by: NURSE PRACTITIONER

## 2022-01-22 PROCEDURE — 80053 COMPREHEN METABOLIC PANEL: CPT | Performed by: NURSE PRACTITIONER

## 2022-01-22 PROCEDURE — 99225 PR SUBSEQUENT OBSERVATION CARE,LEVEL II: CPT | Mod: ,,, | Performed by: STUDENT IN AN ORGANIZED HEALTH CARE EDUCATION/TRAINING PROGRAM

## 2022-01-22 PROCEDURE — 82330 ASSAY OF CALCIUM: CPT | Performed by: HOSPITALIST

## 2022-01-22 PROCEDURE — 99225 PR SUBSEQUENT OBSERVATION CARE,LEVEL II: ICD-10-PCS | Mod: ,,, | Performed by: STUDENT IN AN ORGANIZED HEALTH CARE EDUCATION/TRAINING PROGRAM

## 2022-01-22 PROCEDURE — 25000003 PHARM REV CODE 250: Performed by: NURSE PRACTITIONER

## 2022-01-22 PROCEDURE — 25000003 PHARM REV CODE 250: Performed by: STUDENT IN AN ORGANIZED HEALTH CARE EDUCATION/TRAINING PROGRAM

## 2022-01-22 PROCEDURE — G0378 HOSPITAL OBSERVATION PER HR: HCPCS

## 2022-01-22 RX ORDER — POTASSIUM CHLORIDE 750 MG/1
40 CAPSULE, EXTENDED RELEASE ORAL ONCE
Status: COMPLETED | OUTPATIENT
Start: 2022-01-22 | End: 2022-01-22

## 2022-01-22 RX ORDER — CLINDAMYCIN HYDROCHLORIDE 300 MG/1
300 CAPSULE ORAL EVERY 6 HOURS
Qty: 24 CAPSULE | Refills: 0 | Status: SHIPPED | OUTPATIENT
Start: 2022-01-22 | End: 2022-01-28

## 2022-01-22 RX ADMIN — CLINDAMYCIN HYDROCHLORIDE 300 MG: 150 CAPSULE ORAL at 06:01

## 2022-01-22 RX ADMIN — Medication 1 CAPSULE: at 08:01

## 2022-01-22 RX ADMIN — POTASSIUM CHLORIDE 40 MEQ: 10 CAPSULE, COATED, EXTENDED RELEASE ORAL at 08:01

## 2022-01-22 NOTE — ASSESSMENT & PLAN NOTE
No history of diabetes per patient. Glucose of 153 on CMP.  - A1c 5.4, no current needs for DM agents

## 2022-01-22 NOTE — DISCHARGE SUMMARY
[facility] - Emergency Dept  Hospital Medicine  Discharge Summary      Patient Name: Fredrick Cruz III  MRN: [id number]  Patient Class: OP- Observation  Admission Date: 1/20/2022  Hospital Length of Stay: 0 days  Discharge Date and Time:  01/22/2022 9:37 AM  Attending Physician: No att. providers found   Discharging Provider: Melissa Lechuga MD  Primary Care Provider: Primary Doctor Rina      HPI:   Fredrick Cruz III is a 46 y.o. male with a PMHx of eczema and fatty liver disease who presents the ED with complaints of right arm redness. The patient reports a lesion on his right arm that he scratched and became infected about 1.5-2 weeks ago. He reports the lesion became very red, swollen, and painful. He noted purulent discharge but states after 2-3 days his symptoms improved. This morning he noted a red streak from his right inner wrist to his armpit. He denies any associated fever, chills, nausea, vomiting, or diarrhea. He denies any previous history of any skin infections. The patient notes a sore throat a few days ago which has since resolved but still has a mild non productive cough that began a few days ago. The patient denies any chest pain, abdominal pain, headache, numbness/tingling, or dysuria.    In the ED, VSSAF. CBC unremarkable.  Lactic acid WNL.CMP with mild hyperglycemia. CRP and ESR in process. Blood cxs in process. Xrays of right hand with no obvious evidence of an acute injury or process involving the right hand. The patient received zosyn and vancomycin in the ED, noted to have itching with zosyn so was also given benadryl.       * No surgery found *      Hospital Course:   Patient was switched to clindamycin PO and the streaking noted on his R thumb/forearm continued to decrease in color. Patient was discharged with 7 days of clindamycin sent to his local pharmacy. Patient also provided phone number in the event his blood cultures returned positive.        Goals of Care Treatment  Preferences:  Code Status: Full Code      Consults:     * Cellulitis of right upper extremity  The patient reports a lesion on right arm that he scratched and became infected about 1.5-2 weeks ago. He reports the lesion became very red, swollen, and painful. He noted purulent discharge but states after 2-3 days it improved. He reports this morning he noted a red streak from his right inner wrist to his armpit. He denies any associated fever, chills, nausea, vomiting, or diarrhea. Afebrile and no leukocytosis on labs  - XR R hand w/ no obvious evidence of an acute injury or process involving the right hand.  - Patient received vancomycin and zosyn in the ED, nursing reported itching with zosyn and redness with vancomycin. Changed to clindamycin on discharge and patient tolerated it well. Patient to continue clinda 300mg q6h for 7 day total course  - BCx collected and NGTD, will call patient if they return positive      Hyperglycemia  No history of diabetes per patient. Glucose of 153 on CMP.  - A1c 5.4, no current needs for DM agents      Final Active Diagnoses:    Diagnosis Date Noted POA    PRINCIPAL PROBLEM:  Cellulitis of right upper extremity [L03.113] 01/20/2022 Yes    Class 1 obesity in adult [E66.9] 01/21/2022 Yes    Hyperglycemia [R73.9] 01/21/2022 Yes      Problems Resolved During this Admission:       Discharged Condition: good    Disposition: Home or Self Care    Follow Up:    Patient Instructions:   No discharge procedures on file.    Significant Diagnostic Studies: Labs:   BMP:   Recent Labs   Lab 01/20/22  2326 01/22/22  0407   * 92   * 140   K 3.8 3.3*    115*   CO2 22* 16*   BUN 13 8   CREATININE 1.2 0.8   CALCIUM 8.4* 6.6*     Microbiology: Blood Culture No results found for: LABBLOO    Pending Diagnostic Studies:     None         Medications:  Reconciled Home Medications:      Medication List      START taking these medications    clindamycin 300 MG capsule  Commonly known as:  CLEOCIN  Take 1 capsule (300 mg total) by mouth every 6 (six) hours. for 6 days     Lactobacillus rhamnosus GG 10 billion cell capsule  Commonly known as: CULTURELLE  Take 1 capsule by mouth once daily. for 6 days        CONTINUE taking these medications    B12 ACTIVE ORAL  Take by mouth 1-2 times per week as needed.     EPA-DHA ORAL  Take 1 capsule by mouth as needed as a dietary supplement            Indwelling Lines/Drains at time of discharge:   Lines/Drains/Airways     None                 Time spent on the discharge of patient: 20 minutes         Melissa Lechuga MD  Department of Hospital Medicine  [facility] - Emergency Dept

## 2022-01-22 NOTE — ASSESSMENT & PLAN NOTE
The patient reports a lesion on right arm that he scratched and became infected about 1.5-2 weeks ago. He reports the lesion became very red, swollen, and painful. He noted purulent discharge but states after 2-3 days it improved. He reports this morning he noted a red streak from his right inner wrist to his armpit. He denies any associated fever, chills, nausea, vomiting, or diarrhea. Afebrile and no leukocytosis on labs  - XR R hand w/ no obvious evidence of an acute injury or process involving the right hand.  - Patient received vancomycin and zosyn in the ED, nursing reported itching with zosyn and redness with vancomycin. Changed to clindamycin on discharge and patient tolerated it well. Patient to continue clinda 300mg q6h for 7 day total course  - BCx collected and NGTD, will call patient if they return positive

## 2022-01-22 NOTE — HOSPITAL COURSE
Patient was switched to clindamycin PO and the streaking noted on his R thumb/forearm continued to decrease in color. Patient was discharged with 7 days of clindamycin sent to his local pharmacy. Patient also provided phone number in the event his blood cultures returned positive. Of note, patient had low calcium noted on routine CMP, however ionized calcium normal.

## 2022-01-25 LAB
BACTERIA BLD CULT: NORMAL
BACTERIA BLD CULT: NORMAL

## 2022-10-19 ENCOUNTER — OFFICE VISIT (OUTPATIENT)
Dept: URGENT CARE | Facility: CLINIC | Age: 47
End: 2022-10-19
Payer: MEDICAID

## 2022-10-19 ENCOUNTER — NURSE TRIAGE (OUTPATIENT)
Dept: ADMINISTRATIVE | Facility: CLINIC | Age: 47
End: 2022-10-19
Payer: MEDICAID

## 2022-10-19 VITALS
HEART RATE: 82 BPM | BODY MASS INDEX: 32.58 KG/M2 | SYSTOLIC BLOOD PRESSURE: 154 MMHG | TEMPERATURE: 97 F | OXYGEN SATURATION: 98 % | RESPIRATION RATE: 20 BRPM | WEIGHT: 220 LBS | DIASTOLIC BLOOD PRESSURE: 108 MMHG | HEIGHT: 69 IN

## 2022-10-19 DIAGNOSIS — R03.0 ELEVATED BLOOD-PRESSURE READING, WITHOUT DIAGNOSIS OF HYPERTENSION: ICD-10-CM

## 2022-10-19 DIAGNOSIS — R00.2 PALPITATIONS WITH REGULAR CARDIAC RHYTHM: Primary | ICD-10-CM

## 2022-10-19 PROCEDURE — 93005 ELECTROCARDIOGRAM TRACING: CPT | Mod: S$GLB,,, | Performed by: INTERNAL MEDICINE

## 2022-10-19 PROCEDURE — 3044F HG A1C LEVEL LT 7.0%: CPT | Mod: CPTII,S$GLB,, | Performed by: INTERNAL MEDICINE

## 2022-10-19 PROCEDURE — 1159F PR MEDICATION LIST DOCUMENTED IN MEDICAL RECORD: ICD-10-PCS | Mod: CPTII,S$GLB,, | Performed by: INTERNAL MEDICINE

## 2022-10-19 PROCEDURE — 99205 OFFICE O/P NEW HI 60 MIN: CPT | Mod: S$GLB,,, | Performed by: INTERNAL MEDICINE

## 2022-10-19 PROCEDURE — 99205 PR OFFICE/OUTPT VISIT, NEW, LEVL V, 60-74 MIN: ICD-10-PCS | Mod: S$GLB,,, | Performed by: INTERNAL MEDICINE

## 2022-10-19 PROCEDURE — 3080F DIAST BP >= 90 MM HG: CPT | Mod: CPTII,S$GLB,, | Performed by: INTERNAL MEDICINE

## 2022-10-19 PROCEDURE — 3077F PR MOST RECENT SYSTOLIC BLOOD PRESSURE >= 140 MM HG: ICD-10-PCS | Mod: CPTII,S$GLB,, | Performed by: INTERNAL MEDICINE

## 2022-10-19 PROCEDURE — 3044F PR MOST RECENT HEMOGLOBIN A1C LEVEL <7.0%: ICD-10-PCS | Mod: CPTII,S$GLB,, | Performed by: INTERNAL MEDICINE

## 2022-10-19 PROCEDURE — 3080F PR MOST RECENT DIASTOLIC BLOOD PRESSURE >= 90 MM HG: ICD-10-PCS | Mod: CPTII,S$GLB,, | Performed by: INTERNAL MEDICINE

## 2022-10-19 PROCEDURE — 93010 EKG 12-LEAD: ICD-10-PCS | Mod: S$PBB,,, | Performed by: INTERNAL MEDICINE

## 2022-10-19 PROCEDURE — 93005 EKG 12-LEAD: ICD-10-PCS | Mod: S$GLB,,, | Performed by: INTERNAL MEDICINE

## 2022-10-19 PROCEDURE — 1159F MED LIST DOCD IN RCRD: CPT | Mod: CPTII,S$GLB,, | Performed by: INTERNAL MEDICINE

## 2022-10-19 PROCEDURE — 93010 ELECTROCARDIOGRAM REPORT: CPT | Mod: S$PBB,,, | Performed by: INTERNAL MEDICINE

## 2022-10-19 PROCEDURE — 3077F SYST BP >= 140 MM HG: CPT | Mod: CPTII,S$GLB,, | Performed by: INTERNAL MEDICINE

## 2022-10-19 NOTE — PROGRESS NOTES
Subjective:       Patient ID: Fredrick Cruz III is a 47 y.o. male.    Chief Complaint: No chief complaint on file.    HPI  ROS     Objective:      Physical Exam    Assessment:       No diagnosis found.    Plan:                   No follow-ups on file.

## 2022-10-19 NOTE — PROGRESS NOTES
"Subjective:       Patient ID: Fredrick Cruz III is a 47 y.o. male.    Vitals:  height is 5' 9" (1.753 m) and weight is 99.8 kg (220 lb). His temperature is 97.1 °F (36.2 °C). His blood pressure is 154/108 (abnormal) and his pulse is 82. His respiration is 20 and oxygen saturation is 98%.     Chief Complaint: No chief complaint on file.    Pt states he has been having palpations last 5 night he state he feels like it "skips a beat " denies c/p or sob., He is healthy, takes a protein powder for work outs. No tobacco use, no drug use, no energy drink use. No dizziness, lightheaddeness, syncope      Palpitations   This is a new problem. The current episode started in the past 7 days. The problem occurs constantly. The problem has been gradually worsening.     Cardiovascular:  Positive for palpitations.   Skin:  Negative for erythema.     Objective:      Physical Exam   Constitutional: He is oriented to person, place, and time. He appears well-developed.  Non-toxic appearance. He does not appear ill. No distress. normal  HENT:   Head: Normocephalic and atraumatic.   Ears:   Right Ear: External ear normal.   Left Ear: External ear normal.   Nose: Nose normal.   Mouth/Throat: Oropharynx is clear and moist. Mucous membranes are moist. No oropharyngeal exudate.   Eyes: Conjunctivae and EOM are normal. Pupils are equal, round, and reactive to light. Right eye exhibits no discharge. Left eye exhibits no discharge. No scleral icterus. Extraocular movement intact   Neck: Neck supple.   Cardiovascular: Normal rate, regular rhythm, normal heart sounds and normal pulses.   No murmur heard.Exam reveals no gallop and no friction rub.   Pulmonary/Chest: Effort normal. No respiratory distress. He has no wheezes. He has no rales.   Abdominal: Normal appearance and bowel sounds are normal. He exhibits no distension. Soft.   Musculoskeletal:      Right lower leg: No edema.      Left lower leg: No edema.   Lymphadenopathy:     He " has no cervical adenopathy.   Neurological: He is alert and oriented to person, place, and time.   Skin: Skin is warm, dry, not diaphoretic, not pale and no rash. Capillary refill takes less than 2 seconds. No bruising and No erythema jaundice  Psychiatric: His behavior is normal. Mood, judgment and thought content normal.   Nursing note and vitals reviewed.        EKG reviewed, normal sinus rhythm, ventricular rate is 68 beats per minute, no STEMI, no ischemia, no arrhythmia.  No prior EKGs are available for comparison  Assessment:       1. Palpitations with regular cardiac rhythm    2. Elevated blood-pressure reading, without diagnosis of hypertension          Plan:         Palpitations with regular cardiac rhythm  -     Ambulatory referral/consult to Cardiology    Elevated blood-pressure reading, without diagnosis of hypertension  -     Ambulatory referral/consult to Cardiology       Patient not interested in antihypertensives at this time.

## 2022-10-19 NOTE — TELEPHONE ENCOUNTER
Patient c/o heart palpitations that began 7 days ago. Patient denies symptoms now. States that symptoms usually occur at night. Patient would like to be seen today. No appointments were available. Patient plans to go to the nearest  for further evaluation.  Advised the patient to call back with any further questions or if symptoms worsen.        Reason for Disposition   Patient wants to be seen    Additional Information   Negative: Passed out (i.e., lost consciousness, collapsed and was not responding)   Negative: Shock suspected (e.g., cold/pale/clammy skin, too weak to stand, low BP, rapid pulse)   Negative: Difficult to awaken or acting confused (e.g., disoriented, slurred speech)   Negative: Visible sweat on face or sweat dripping down face   Negative: Unable to walk, or can only walk with assistance (e.g., requires support)   Negative: Received SHOCK from implantable cardiac defibrillator and has persisting symptoms (i.e., palpitations, lightheadedness)   Negative: Dizziness, lightheadedness, or weakness and heart beating very rapidly (e.g., > 140 / minute)   Negative: Dizziness, lightheadedness, or weakness and heart beating very slowly (e.g., < 50 / minute)   Negative: Sounds like a life-threatening emergency to the triager   Negative: Dizziness, lightheadedness, or weakness   Negative: Heart beating very rapidly (e.g., > 140 / minute) and present now  (Exception: During exercise.)   Negative: Heart beating very slowly (e.g., < 50 / minute)  (Exception: Athlete and heart rate normal for caller.)   Negative: Difficulty breathing   Negative: New or worsened shortness of breath with activity (dyspnea on exertion)   Negative: Patient sounds very sick or weak to the triager   Negative: Wearing a 'Holter monitor' or 'cardiac event monitor'   Negative: Received SHOCK from implantable cardiac defibrillator (and now feels well)   Negative: Taking water pill (i.e., diuretic) or heart medication (e.g., digoxin)    Negative: History of heart disease (i.e., heart attack, bypass surgery, angina, angioplasty)  (Exception: Brief heartbeat symptoms that went away and now feels well.)   Negative: Age > 60 years  (Exception: Brief heartbeat symptoms that went away and now feels well.)   Negative: Heart beating very rapidly (e.g., > 140 / minute) and not present now  (Exception: During exercise.)   Negative: Skipped or extra beat(s) and increases with exercise or exertion   Negative: Skipped or extra beat(s) and occurs 4 or more times per minute    Protocols used: Heart Rate and Heartbeat [id number]

## 2022-10-20 ENCOUNTER — HOSPITAL ENCOUNTER (EMERGENCY)
Facility: HOSPITAL | Age: 47
Discharge: HOME | End: 2022-10-20
Attending: EMERGENCY MEDICINE
Payer: MEDICAID

## 2022-10-20 VITALS
SYSTOLIC BLOOD PRESSURE: 139 MMHG | TEMPERATURE: 98 F | OXYGEN SATURATION: 95 % | WEIGHT: 220 LBS | HEART RATE: 62 BPM | RESPIRATION RATE: 18 BRPM | BODY MASS INDEX: 32.49 KG/M2 | DIASTOLIC BLOOD PRESSURE: 94 MMHG

## 2022-10-20 DIAGNOSIS — R00.2 PALPITATIONS: ICD-10-CM

## 2022-10-20 LAB
ALBUMIN SERPL BCP-MCNC: 3.9 G/DL (ref 3.5–5.2)
ALP SERPL-CCNC: 76 U/L (ref 55–135)
ALT SERPL W/O P-5'-P-CCNC: 23 U/L (ref 10–44)
ANION GAP SERPL CALC-SCNC: 8 MMOL/L (ref 8–16)
AST SERPL-CCNC: 16 U/L (ref 10–40)
BASOPHILS # BLD AUTO: 0.06 K/UL (ref 0–0.2)
BASOPHILS NFR BLD: 0.9 % (ref 0–1.9)
BILIRUB SERPL-MCNC: 0.4 MG/DL (ref 0.1–1)
BUN SERPL-MCNC: 12 MG/DL (ref 6–20)
CALCIUM SERPL-MCNC: 8.8 MG/DL (ref 8.7–10.5)
CHLORIDE SERPL-SCNC: 107 MMOL/L (ref 95–110)
CO2 SERPL-SCNC: 22 MMOL/L (ref 23–29)
CREAT SERPL-MCNC: 0.9 MG/DL (ref 0.5–1.4)
D DIMER PPP IA.FEU-MCNC: 0.27 MG/L FEU
DIFFERENTIAL METHOD: NORMAL
EOSINOPHIL # BLD AUTO: 0.2 K/UL (ref 0–0.5)
EOSINOPHIL NFR BLD: 3.3 % (ref 0–8)
ERYTHROCYTE [DISTWIDTH] IN BLOOD BY AUTOMATED COUNT: 12.1 % (ref 11.5–14.5)
EST. GFR  (NO RACE VARIABLE): >60 ML/MIN/1.73 M^2
GLUCOSE SERPL-MCNC: 98 MG/DL (ref 70–110)
HCT VFR BLD AUTO: 44.4 % (ref 40–54)
HCV AB SERPL QL IA: NORMAL
HGB BLD-MCNC: 15.2 G/DL (ref 14–18)
HIV 1+2 AB+HIV1 P24 AG SERPL QL IA: NORMAL
IMM GRANULOCYTES # BLD AUTO: 0.02 K/UL (ref 0–0.04)
IMM GRANULOCYTES NFR BLD AUTO: 0.3 % (ref 0–0.5)
LYMPHOCYTES # BLD AUTO: 2 K/UL (ref 1–4.8)
LYMPHOCYTES NFR BLD: 28.8 % (ref 18–48)
MAGNESIUM SERPL-MCNC: 2 MG/DL (ref 1.6–2.6)
MCH RBC QN AUTO: 29.6 PG (ref 27–31)
MCHC RBC AUTO-ENTMCNC: 34.2 G/DL (ref 32–36)
MCV RBC AUTO: 86 FL (ref 82–98)
MONOCYTES # BLD AUTO: 0.4 K/UL (ref 0.3–1)
MONOCYTES NFR BLD: 5.5 % (ref 4–15)
NEUTROPHILS # BLD AUTO: 4.3 K/UL (ref 1.8–7.7)
NEUTROPHILS NFR BLD: 61.2 % (ref 38–73)
NRBC BLD-RTO: 0 /100 WBC
PLATELET # BLD AUTO: 180 K/UL (ref 150–450)
PMV BLD AUTO: 11.4 FL (ref 9.2–12.9)
POTASSIUM SERPL-SCNC: 3.9 MMOL/L (ref 3.5–5.1)
PROT SERPL-MCNC: 6.9 G/DL (ref 6–8.4)
RBC # BLD AUTO: 5.14 M/UL (ref 4.6–6.2)
SODIUM SERPL-SCNC: 137 MMOL/L (ref 136–145)
TROPONIN I SERPL DL<=0.01 NG/ML-MCNC: <0.006 NG/ML (ref 0–0.03)
TSH SERPL DL<=0.005 MIU/L-ACNC: 1.79 UIU/ML (ref 0.4–4)
WBC # BLD AUTO: 6.95 K/UL (ref 3.9–12.7)

## 2022-10-20 PROCEDURE — 85025 COMPLETE CBC W/AUTO DIFF WBC: CPT | Performed by: EMERGENCY MEDICINE

## 2022-10-20 PROCEDURE — 84443 ASSAY THYROID STIM HORMONE: CPT | Performed by: EMERGENCY MEDICINE

## 2022-10-20 PROCEDURE — 99284 PR EMERGENCY DEPT VISIT,LEVEL IV: ICD-10-PCS | Mod: ,,, | Performed by: EMERGENCY MEDICINE

## 2022-10-20 PROCEDURE — 99285 EMERGENCY DEPT VISIT HI MDM: CPT | Mod: 25

## 2022-10-20 PROCEDURE — 93010 EKG 12-LEAD: ICD-10-PCS | Mod: ,,, | Performed by: INTERNAL MEDICINE

## 2022-10-20 PROCEDURE — 84484 ASSAY OF TROPONIN QUANT: CPT | Performed by: EMERGENCY MEDICINE

## 2022-10-20 PROCEDURE — 93010 ELECTROCARDIOGRAM REPORT: CPT | Mod: ,,, | Performed by: INTERNAL MEDICINE

## 2022-10-20 PROCEDURE — 85379 FIBRIN DEGRADATION QUANT: CPT | Performed by: EMERGENCY MEDICINE

## 2022-10-20 PROCEDURE — 93005 ELECTROCARDIOGRAM TRACING: CPT

## 2022-10-20 PROCEDURE — 80053 COMPREHEN METABOLIC PANEL: CPT | Performed by: EMERGENCY MEDICINE

## 2022-10-20 PROCEDURE — 87389 HIV-1 AG W/HIV-1&-2 AB AG IA: CPT | Performed by: PHYSICIAN ASSISTANT

## 2022-10-20 PROCEDURE — 86803 HEPATITIS C AB TEST: CPT | Performed by: PHYSICIAN ASSISTANT

## 2022-10-20 PROCEDURE — 83735 ASSAY OF MAGNESIUM: CPT | Performed by: EMERGENCY MEDICINE

## 2022-10-20 PROCEDURE — 99284 EMERGENCY DEPT VISIT MOD MDM: CPT | Mod: ,,, | Performed by: EMERGENCY MEDICINE

## 2022-10-20 NOTE — ED TRIAGE NOTES
Reports heart palpitations for past couple days. Last time he felt it was at 0300. Feels like he pulled a muscle in his chest working out. Reports BP of 180/100 at home. Denies CP, SOB, N/V.

## 2022-10-20 NOTE — DISCHARGE INSTRUCTIONS
Please return to the emergency department if he develops any elevated heart rate, lightheadedness, chest pain, shortness of breath, loss of consciousness or any other concerns

## 2022-10-20 NOTE — ED PROVIDER NOTES
"CC: Palpitations (Pt c/o heart palpitations x6 days. Pt states It feels like "his heart skips a beat." Pt denies dizziness or SOB. Pt also states his blood pressure has been elevated over the last couple of days. Pt denies any pertinent cardiac history. )      History provided by:   Patient     HPI: Fredrick Cruz III is a 47 y.o. year old male past medical history of fatty liver who presents to the ED for palpitations    He has been reporting episodes of palpitations for the last 2 days especially at night around 7:00 p.m. lasting until about 10:00 p.m., he describes the palpitations as skipped beats, he has checked his pulse during these episodes of palpitations and his heart rate seems to be regular, not tachycardic or bradycardic but about once a minute he feels a pause/skipped beat in his heart rate.  He denies any symptoms such as lightheadedness, syncope, chest pain, shortness of breath with the palpitations    Denies any alcohol or street drugs, no recent travel or immobilization, no lower extremity edema or calf pain, no previous problems with his heart,  quit smoking 6-7 years ago    Past Medical History:   Diagnosis Date    Eczema     Fatty liver      No past surgical history on file.  Family History   Problem Relation Age of Onset    COPD Mother     Hypertension Mother     Diabetes Father     Hepatitis Father      No current facility-administered medications on file prior to encounter.     Current Outpatient Medications on File Prior to Encounter   Medication Sig Dispense Refill    docosahexaenoic acid/epa (EPA-DHA ORAL) Take 1 capsule by mouth as needed as a dietary supplement      mecobalamin (B12 ACTIVE ORAL) Take by mouth 1-2 times per week as needed.       Zosyn [piperacillin-tazobactam]  Social History     Socioeconomic History    Marital status: Single   Tobacco Use    Smoking status: Former     Packs/day: 1.50     Years: 20.00     Pack years: 30.00     Types: Cigarettes     Quit date: 2015 "     Years since quittin.8    Smokeless tobacco: Never       ROS:     Constitutional : neg for fever, neg for weakness  HENT neg for head injury, neg for sore throat  Eyes: neg for visual changes, neg for eye pain  Resp neg for SOB, neg for cough  Cardiac  neg for chest pain, pos for palpitations  GI neg for abd pain, neg for nausea, neg for vomiting   neg for urinary changes  Neuro neg for focal weakness or numbness  Skin neg for skin rash  MSK: neg for myalgia, neg for arthralgia  ALL: Zosyn [piperacillin-tazobactam]    PHYSICAL EXAM:  Vitals:    10/20/22 0910   BP: (!) 139/94   Pulse: 62   Resp: 18   Temp: 97.6 °F (36.4 °C)     VS: triage VS reviewed    general: comfortable, in no acute distress, pleasant, well nourished  HENT: neck symmetric, trachea midline  Eyes: PERRL,no conjunctival injection and symmetrical eyelids  CV: RRR, no  murmurs, no rubs, no gallops, no LE edema  Resp: comfortable breathing, speaks in full sentences, CTAB, no wheezing, no crackles, no ronchi  ABD:  soft, ND, no masses, + normal BS, NT  Renal: No CVAT  Neuro: AAO x 3, face symmetric, speech normal  MSK: NC/AT, extremities w/out deformity   Skin: warm, dry            DATA & INTERVENTIONS:    LABS reviewed:  Labs Reviewed   COMPREHENSIVE METABOLIC PANEL - Abnormal; Notable for the following components:       Result Value    CO2 22 (*)     All other components within normal limits    Narrative:     Release to patient->Immediate   HIV 1 / 2 ANTIBODY    Narrative:     Release to patient->Immediate   HEPATITIS C ANTIBODY    Narrative:     Release to patient->Immediate   CBC W/ AUTO DIFFERENTIAL    Narrative:     Release to patient->Immediate   MAGNESIUM    Narrative:     Release to patient->Immediate   TROPONIN I    Narrative:     Release to patient->Immediate   D DIMER, QUANTITATIVE    Narrative:     Release to patient->Immediate   TSH    Narrative:     Release to patient->Immediate       RADIOLOGY reviewed:  Imaging Results               X-Ray Chest AP Portable (Final result)  Result time 10/20/22 07:26:56      Final result by Ran Trivedi MD (10/20/22 07:26:56)                   Impression:      No significant intrathoracic abnormality.      Electronically signed by: Ran Trivedi MD  Date:    10/20/2022  Time:    07:26               Narrative:    EXAMINATION:  XR CHEST AP PORTABLE    CLINICAL HISTORY:  palpitations;    TECHNIQUE:  One view    COMPARISON:  No prior chest radiographs are currently available for comparison purposes.  Clinical information obtained from the electronic medical record indicates palpitations.    FINDINGS:  Heart size is normal, as is the appearance of the pulmonary vascularity, with no findings to specifically suggest cardiac decompensation noted.  Lung zones are clear, and are free of significant airspace consolidation or volume loss.  No pleural fluid.  No hilar or mediastinal mass lesion.  No pneumothorax.                                      MEDICATIONS/FLUIDS:  Medications - No data to display      MDM:  Fredrick Cruz III is a 47 y.o. year old male who presents to the ED for palpitations    From description unlikely PACs.  No signs of heart failure on exam, no symptoms to suggest ACS recently, no drug abuse, no symptoms to suggest electrolyte abnormalities or anemia    DDX includes but not limited to:  As above    Labs ordered and reviewed:   CMP no gross abnormalities   CBC normal white count, hemoglobin and platelets   Magnesium within normal limits  Troponin within normal limits   D-dimer negative   TSH within normal limits     Medication given in the ED: n/a    CXR (ordered and reviewed):  No acute  Imagings independently visualized: yes        EKG (independantly reviewed):  Ventricular rate 64, normal sinus rhythm, no acute changes no arrhythmia    Patient also concerned his blood pressure has been elevated recently blood pressure in the /91,  patient advised to keep a journal with his blood  pressure at home and follow-up with his PCP within the week He was also advised to follow-up with his PCP for outpatient echo and event versus Holter monitor    The patient has been carefully educated about symptoms and conditions that should prompt immediate return to the ED for recheck or further evaluation. Told to return immediately for any new or worsening or progressive symptoms.     IMPRESSION:  1.)  Palpitations      Dispo: Discharge    Critical Care Time: N/A       Shirin Nicole MD  10/21/22 6130

## 2022-10-20 NOTE — ED NOTES
Assumed care of patient. Plan of care discussed and patient has no complaints or questions. Pt is in bed awake and alert. Pt is resting comfortably and is in no acute distress. Respirations are even and unlabored. Pt denies chest pain or SOB. Patient on continuous cardiac monitor, automatic blood pressure cuff and continuous pulse oximeter. VSS. AAOx3. No needs expressed at this time. Side rails up and bed in lowest position. Call light in reach. Instructed patient to call staff for assistance with mobility. Will continue to monitor. Rounding completed on patient.     Pt instructed to use call light when he is having an episode of palpitations/discomfort to capture on telemetry

## 2022-11-03 ENCOUNTER — OFFICE VISIT (OUTPATIENT)
Dept: CARDIOLOGY | Facility: CLINIC | Age: 47
End: 2022-11-03
Payer: MEDICAID

## 2022-11-03 VITALS
OXYGEN SATURATION: 97 % | BODY MASS INDEX: 33.55 KG/M2 | SYSTOLIC BLOOD PRESSURE: 130 MMHG | DIASTOLIC BLOOD PRESSURE: 80 MMHG | WEIGHT: 226.5 LBS | HEIGHT: 69 IN | HEART RATE: 67 BPM

## 2022-11-03 DIAGNOSIS — I10 PRIMARY HYPERTENSION: ICD-10-CM

## 2022-11-03 DIAGNOSIS — E66.09 CLASS 1 OBESITY DUE TO EXCESS CALORIES WITH SERIOUS COMORBIDITY AND BODY MASS INDEX (BMI) OF 33.0 TO 33.9 IN ADULT: ICD-10-CM

## 2022-11-03 DIAGNOSIS — R00.2 PALPITATIONS: ICD-10-CM

## 2022-11-03 DIAGNOSIS — Z91.89 AT RISK FOR SLEEP APNEA: ICD-10-CM

## 2022-11-03 PROCEDURE — 3079F PR MOST RECENT DIASTOLIC BLOOD PRESSURE 80-89 MM HG: ICD-10-PCS | Mod: CPTII,,, | Performed by: INTERNAL MEDICINE

## 2022-11-03 PROCEDURE — 3079F DIAST BP 80-89 MM HG: CPT | Mod: CPTII,,, | Performed by: INTERNAL MEDICINE

## 2022-11-03 PROCEDURE — 3008F PR BODY MASS INDEX (BMI) DOCUMENTED: ICD-10-PCS | Mod: CPTII,,, | Performed by: INTERNAL MEDICINE

## 2022-11-03 PROCEDURE — 1159F PR MEDICATION LIST DOCUMENTED IN MEDICAL RECORD: ICD-10-PCS | Mod: CPTII,,, | Performed by: INTERNAL MEDICINE

## 2022-11-03 PROCEDURE — 3075F SYST BP GE 130 - 139MM HG: CPT | Mod: CPTII,,, | Performed by: INTERNAL MEDICINE

## 2022-11-03 PROCEDURE — 99214 OFFICE O/P EST MOD 30 MIN: CPT | Mod: PBBFAC,PN | Performed by: INTERNAL MEDICINE

## 2022-11-03 PROCEDURE — 99204 PR OFFICE/OUTPT VISIT, NEW, LEVL IV, 45-59 MIN: ICD-10-PCS | Mod: S$PBB,,, | Performed by: INTERNAL MEDICINE

## 2022-11-03 PROCEDURE — 3075F PR MOST RECENT SYSTOLIC BLOOD PRESS GE 130-139MM HG: ICD-10-PCS | Mod: CPTII,,, | Performed by: INTERNAL MEDICINE

## 2022-11-03 PROCEDURE — 1160F RVW MEDS BY RX/DR IN RCRD: CPT | Mod: CPTII,,, | Performed by: INTERNAL MEDICINE

## 2022-11-03 PROCEDURE — 3008F BODY MASS INDEX DOCD: CPT | Mod: CPTII,,, | Performed by: INTERNAL MEDICINE

## 2022-11-03 PROCEDURE — 99999 PR PBB SHADOW E&M-EST. PATIENT-LVL IV: CPT | Mod: PBBFAC,,, | Performed by: INTERNAL MEDICINE

## 2022-11-03 PROCEDURE — 3044F HG A1C LEVEL LT 7.0%: CPT | Mod: CPTII,,, | Performed by: INTERNAL MEDICINE

## 2022-11-03 PROCEDURE — 99204 OFFICE O/P NEW MOD 45 MIN: CPT | Mod: S$PBB,,, | Performed by: INTERNAL MEDICINE

## 2022-11-03 PROCEDURE — 3044F PR MOST RECENT HEMOGLOBIN A1C LEVEL <7.0%: ICD-10-PCS | Mod: CPTII,,, | Performed by: INTERNAL MEDICINE

## 2022-11-03 PROCEDURE — 1160F PR REVIEW ALL MEDS BY PRESCRIBER/CLIN PHARMACIST DOCUMENTED: ICD-10-PCS | Mod: CPTII,,, | Performed by: INTERNAL MEDICINE

## 2022-11-03 PROCEDURE — 1159F MED LIST DOCD IN RCRD: CPT | Mod: CPTII,,, | Performed by: INTERNAL MEDICINE

## 2022-11-03 PROCEDURE — 99999 PR PBB SHADOW E&M-EST. PATIENT-LVL IV: ICD-10-PCS | Mod: PBBFAC,,, | Performed by: INTERNAL MEDICINE

## 2022-11-03 NOTE — PROGRESS NOTES
Subjective:    Patient ID:  Fredrick Cruz III is a 47 y.o. male who presents for evaluation of Palpitations      PCP: Primary Doctor No     HPI  Pt is a 46 yo F w/ PMH of PALMA who presents for evaluation of palpitations x3 wks.  He was seen in the ED 10/20/2022 w/ c/o palpitations for 2 days and had a negative cardiac w/u in the ED and was d/c'd home w/ cardiology f/u.  He also was seen in  10/19/2022 for the same c/o however nothing was done for him and he was referred to cardiology.  He mentions that he has been having frequent episodes which were nightly for 2 weeks however notes that they happen mostly at night.   He denies cp, sob, edema, orthopnea, PND, presyncope, LOC, claudication.  He monitors his BP at home and states it runs 130-180s/70-90s.  He states that he was exercising at the gym regularly for the past month however when he stopped he shortly noted the palpitations.      Past Medical History:   Diagnosis Date    Eczema     Fatty liver      History reviewed. No pertinent surgical history.  Social History     Socioeconomic History    Marital status: Single   Tobacco Use    Smoking status: Former     Packs/day: 1.50     Years: 20.00     Pack years: 30.00     Types: Cigarettes     Quit date:      Years since quittin.8    Smokeless tobacco: Never     Family History   Problem Relation Age of Onset    COPD Mother     Hypertension Mother     Diabetes Father     Hepatitis Father        Review of patient's allergies indicates:   Allergen Reactions    Zosyn [piperacillin-tazobactam] Itching       Medication List with Changes/Refills   Discontinued Medications    DOCOSAHEXAENOIC ACID/EPA (EPA-DHA ORAL)    Take 1 capsule by mouth as needed as a dietary supplement    MECOBALAMIN (B12 ACTIVE ORAL)    Take by mouth 1-2 times per week as needed.       Review of Systems   Constitutional: Negative for diaphoresis and fever.   HENT:  Negative for congestion and hearing loss.    Eyes:  Negative for  "blurred vision and pain.   Cardiovascular:  Positive for palpitations. Negative for chest pain, claudication, dyspnea on exertion, leg swelling, near-syncope and syncope.   Respiratory:  Positive for sleep disturbances due to breathing and snoring. Negative for shortness of breath.    Hematologic/Lymphatic: Negative for bleeding problem. Does not bruise/bleed easily.   Skin:  Negative for color change and poor wound healing.   Gastrointestinal:  Negative for abdominal pain and nausea.   Genitourinary:  Negative for bladder incontinence and flank pain.   Neurological:  Negative for focal weakness and light-headedness.      Objective:   /80   Pulse 67   Ht 5' 9" (1.753 m)   Wt 102.7 kg (226 lb 8 oz)   SpO2 97%   BMI 33.45 kg/m²    Physical Exam  Constitutional:       Appearance: He is well-developed. He is not diaphoretic.   HENT:      Head: Normocephalic and atraumatic.   Eyes:      General: No scleral icterus.     Pupils: Pupils are equal, round, and reactive to light.   Neck:      Vascular: No JVD.   Cardiovascular:      Rate and Rhythm: Normal rate and regular rhythm.      Pulses: Intact distal pulses.      Heart sounds: S1 normal and S2 normal. No murmur heard.    No friction rub. No gallop.   Pulmonary:      Effort: Pulmonary effort is normal. No respiratory distress.      Breath sounds: Normal breath sounds. No wheezing or rales.   Chest:      Chest wall: No tenderness.   Abdominal:      General: Bowel sounds are normal. There is no distension.      Palpations: Abdomen is soft. There is no mass.      Tenderness: There is no abdominal tenderness. There is no rebound.   Musculoskeletal:         General: No tenderness. Normal range of motion.      Cervical back: Normal range of motion and neck supple.   Skin:     General: Skin is warm and dry.      Coloration: Skin is not pale.   Neurological:      Mental Status: He is alert and oriented to person, place, and time.      Coordination: Coordination " normal.      Deep Tendon Reflexes: Reflexes normal.   Psychiatric:         Behavior: Behavior normal.         Judgment: Judgment normal.         ECG: 10/20/2022- reviewed.  NSR, NL ECG.      Assessment:       1. Primary hypertension    2. Class 1 obesity due to excess calories with serious comorbidity and body mass index (BMI) of 33.0 to 33.9 in adult    3. Palpitations    4. At risk for sleep apnea         Plan:         Primary hypertension  Goal BP < 130/80.    - risk factor and lifestyle modification   - pt to continue to monitor BP at home and record    Class 1 obesity due to excess calories with serious comorbidity and body mass index (BMI) of 33.0 to 33.9 in adult  Encouraged lifestyle modifications (diet, exercise, and weight loss).      Palpitations  Noted to be mostly at night and pt w/ risk factors for sleep apnea.    - check 48 hr holter to eval for arrhythmia   - sleep med referral for SOHEILA eval    At risk for sleep apnea  Refer to sleep medicine.        Total duration of face to face visit time 30 minutes.  Total time spent counseling greater than fifty percent of total visit time.  Counseling included discussion regarding imaging findings, diagnosis, possibilities, treatment options, risks and benefits.  The patient had many questions regarding the options and long-term effects      Collin Collins M.D.  Interventional Cardiology

## 2022-11-03 NOTE — ASSESSMENT & PLAN NOTE
Goal BP < 130/80.    - risk factor and lifestyle modification   - pt to continue to monitor BP at home and record

## 2022-11-03 NOTE — ASSESSMENT & PLAN NOTE
Noted to be mostly at night and pt w/ risk factors for sleep apnea.    - check 48 hr holter to eval for arrhythmia   - sleep med referral for SOHEILA eval

## 2022-11-08 ENCOUNTER — TELEPHONE (OUTPATIENT)
Dept: CARDIOLOGY | Facility: CLINIC | Age: 47
End: 2022-11-08
Payer: MEDICAID

## 2022-11-08 NOTE — TELEPHONE ENCOUNTER
Spoke with patient regarding Echo apt. Per Alex Thurman informing me of availability on Thursday and Friday after 11 at [facility]. Pt stated they wanted to have their testing done closer to home. Pt apt scheduled in [place] pt expressed understanding.

## 2022-11-14 ENCOUNTER — HOSPITAL ENCOUNTER (OUTPATIENT)
Dept: CARDIOLOGY | Facility: HOSPITAL | Age: 47
Discharge: HOME | End: 2022-11-14
Attending: INTERNAL MEDICINE
Payer: MEDICAID

## 2022-11-14 DIAGNOSIS — R00.2 PALPITATIONS: ICD-10-CM

## 2022-11-14 PROCEDURE — 93227 HOLTER MONITOR - 48 HOUR (CUPID ONLY): ICD-10-PCS | Mod: ,,, | Performed by: INTERNAL MEDICINE

## 2022-11-14 PROCEDURE — 93225 XTRNL ECG REC<48 HRS REC: CPT

## 2022-11-14 PROCEDURE — 93227 XTRNL ECG REC<48 HR R&I: CPT | Mod: ,,, | Performed by: INTERNAL MEDICINE

## 2022-11-18 LAB
OHS CV EVENT MONITOR DAY: 0
OHS CV HOLTER LENGTH DECIMAL HOURS: 48
OHS CV HOLTER LENGTH HOURS: 48
OHS CV HOLTER LENGTH MINUTES: 0
OHS CV HOLTER SINUS AVERAGE HR: 70
OHS CV HOLTER SINUS MAX HR: 105
OHS CV HOLTER SINUS MIN HR: 50

## 2023-01-26 ENCOUNTER — OFFICE VISIT (OUTPATIENT)
Dept: SLEEP MEDICINE | Facility: CLINIC | Age: 48
End: 2023-01-26
Payer: MEDICAID

## 2023-01-26 VITALS
WEIGHT: 227 LBS | HEART RATE: 77 BPM | BODY MASS INDEX: 33.62 KG/M2 | SYSTOLIC BLOOD PRESSURE: 141 MMHG | HEIGHT: 69 IN | DIASTOLIC BLOOD PRESSURE: 105 MMHG

## 2023-01-26 DIAGNOSIS — F51.09 OTHER INSOMNIA NOT DUE TO A SUBSTANCE OR KNOWN PHYSIOLOGICAL CONDITION: ICD-10-CM

## 2023-01-26 DIAGNOSIS — R06.83 SNORING: Primary | ICD-10-CM

## 2023-01-26 DIAGNOSIS — Z91.89 AT RISK FOR SLEEP APNEA: ICD-10-CM

## 2023-01-26 DIAGNOSIS — I10 HYPERTENSION, UNSPECIFIED TYPE: ICD-10-CM

## 2023-01-26 DIAGNOSIS — G47.30 SLEEP APNEA, UNSPECIFIED TYPE: ICD-10-CM

## 2023-01-26 PROCEDURE — 99999 PR PBB SHADOW E&M-EST. PATIENT-LVL III: ICD-10-PCS | Mod: PBBFAC,,, | Performed by: INTERNAL MEDICINE

## 2023-01-26 PROCEDURE — 99999 PR PBB SHADOW E&M-EST. PATIENT-LVL III: CPT | Mod: PBBFAC,,, | Performed by: INTERNAL MEDICINE

## 2023-01-26 PROCEDURE — 3077F PR MOST RECENT SYSTOLIC BLOOD PRESSURE >= 140 MM HG: ICD-10-PCS | Mod: CPTII,,, | Performed by: INTERNAL MEDICINE

## 2023-01-26 PROCEDURE — 3080F PR MOST RECENT DIASTOLIC BLOOD PRESSURE >= 90 MM HG: ICD-10-PCS | Mod: CPTII,,, | Performed by: INTERNAL MEDICINE

## 2023-01-26 PROCEDURE — 99213 OFFICE O/P EST LOW 20 MIN: CPT | Mod: PBBFAC | Performed by: INTERNAL MEDICINE

## 2023-01-26 PROCEDURE — 99204 PR OFFICE/OUTPT VISIT, NEW, LEVL IV, 45-59 MIN: ICD-10-PCS | Mod: S$PBB,,, | Performed by: INTERNAL MEDICINE

## 2023-01-26 PROCEDURE — 1159F MED LIST DOCD IN RCRD: CPT | Mod: CPTII,,, | Performed by: INTERNAL MEDICINE

## 2023-01-26 PROCEDURE — 3008F PR BODY MASS INDEX (BMI) DOCUMENTED: ICD-10-PCS | Mod: CPTII,,, | Performed by: INTERNAL MEDICINE

## 2023-01-26 PROCEDURE — 99204 OFFICE O/P NEW MOD 45 MIN: CPT | Mod: S$PBB,,, | Performed by: INTERNAL MEDICINE

## 2023-01-26 PROCEDURE — 3077F SYST BP >= 140 MM HG: CPT | Mod: CPTII,,, | Performed by: INTERNAL MEDICINE

## 2023-01-26 PROCEDURE — 3080F DIAST BP >= 90 MM HG: CPT | Mod: CPTII,,, | Performed by: INTERNAL MEDICINE

## 2023-01-26 PROCEDURE — 1159F PR MEDICATION LIST DOCUMENTED IN MEDICAL RECORD: ICD-10-PCS | Mod: CPTII,,, | Performed by: INTERNAL MEDICINE

## 2023-01-26 PROCEDURE — 3008F BODY MASS INDEX DOCD: CPT | Mod: CPTII,,, | Performed by: INTERNAL MEDICINE

## 2023-01-26 NOTE — PROGRESS NOTES
Referred by Collin Collins III*     NEW PATIENT VISIT    Fredrick Cruz III  is a pleasant 47 y.o. male  with PMH significant for HTN, BMI 30+ who presents for snoring.    Sleep Clinic New Patient 1/25/2023   What time do you go to bed on a week day? (Give a range) 12am-4am   What time do you go to bed on a day off? (Give a range) 12am-4am   How long does it take you to fall asleep? (Give a range) 30-60 minutes   On average, how many times per night do you wake up? 2   How long does it take you to fall back into sleep? (Give a range) 10-20 minutes   What time do you wake up to start your day on a week day? (Give a range) 9:30am-11:30am   What time do you wake up to start your day on a day off? (Give a range) 9:30am-11:30am   What time do you get out of bed? (Give a range) 9:45am-11:45am   On average, how many hours do you sleep? 6-8 hours   On average, how many naps do you take per day? 0-1   Rate your sleep quality from 0 to 5 (0-poor, 5-great). 3   Have you experienced:  Weight gain?   How much weight have you lost or gained (in lbs.) in the last year? 15-20lbs   On average, how many times per night do you go to the bathroom?  0-1   Have you ever had a sleep study/CPAP machine/surgery for sleep apnea? No   Have you ever had a CPAP machine for sleep apnea? No   Have you ever had surgery for sleep apnea? No     Sleep Clinic ROS  1/25/2023   Difficulty breathing through the nose?  Sometimes   Sore throat or dry mouth in the morning? Yes   Irregular or very fast heart beat?  Yes   Shortness of breath?  Sometimes   Acid reflux? No   Body aches and pains?  Sometimes   Morning headaches? No   Dizziness? No   Mood changes?  Yes   Do you exercise?  Sometimes   Do you feel like moving your legs a lot?  Sometimes       Past Medical History:   Diagnosis Date    Eczema     Fatty liver     Heart palpitations 07/13/2022     Patient Active Problem List   Diagnosis    Cellulitis of right upper extremity    Class 1  "obesity due to excess calories with serious comorbidity and body mass index (BMI) of 33.0 to 33.9 in adult    Hyperglycemia    Primary hypertension    Palpitations    At risk for sleep apnea     No current outpatient medications on file.     Vitals:    01/26/23 1044   BP: (!) 141/105   BP Location: Right arm   Patient Position: Sitting   BP Method: Medium (Automatic)   Pulse: 77   Weight: 103 kg (227 lb)   Height: 5' 9" (1.753 m)     Physical Exam:    GEN:   Well-appearing  Psych:  Appropriate affect, demonstrates insight  SKIN:  No rash on the face or bridge of the nose      LABS:   Lab Results   Component Value Date    HGB 15.2 10/20/2022    CO2 22 (L) 10/20/2022       RECORDS REVIEWED PREVIOUSLY:    No prior sleep testing.    ASSESSMENT    PROBLEM DESCRIPTION/ Sx on Presentation  STATUS   screening SOHEILA   + snoring, +snoring arousals, + witnessed apneas by his girlfriend  Sleep is unrefreshing at times  Father loud snoring    New   Daytime Sx   denies sleepiness when inactive   ESS 7/24 on intake  New   Insomnia   Onset:                    can take awhile  Maintenance:         occasional trouble  Prior hypnotics:        Current hypnotics: none    New   Other issues:     PLAN     -recommend sleep testing   -discussed trial therapy if SOHEILA present and the patient is  open to a trial of CPAP therapy    RTC          The patient was given open opportunity to ask questions and/or express concerns about treatment plan.   All questions/concerns were discussed.     Two patient identifiers used prior to evaluation.           "

## 2023-02-03 ENCOUNTER — OFFICE VISIT (OUTPATIENT)
Dept: CARDIOLOGY | Facility: CLINIC | Age: 48
End: 2023-02-03
Payer: MEDICAID

## 2023-02-03 VITALS
SYSTOLIC BLOOD PRESSURE: 144 MMHG | HEART RATE: 76 BPM | DIASTOLIC BLOOD PRESSURE: 90 MMHG | BODY MASS INDEX: 33.33 KG/M2 | WEIGHT: 225 LBS | HEIGHT: 69 IN | OXYGEN SATURATION: 98 %

## 2023-02-03 DIAGNOSIS — E66.09 CLASS 1 OBESITY DUE TO EXCESS CALORIES WITH SERIOUS COMORBIDITY AND BODY MASS INDEX (BMI) OF 33.0 TO 33.9 IN ADULT: ICD-10-CM

## 2023-02-03 DIAGNOSIS — Z91.89 AT RISK FOR SLEEP APNEA: ICD-10-CM

## 2023-02-03 DIAGNOSIS — I10 PRIMARY HYPERTENSION: ICD-10-CM

## 2023-02-03 DIAGNOSIS — R00.2 PALPITATIONS: ICD-10-CM

## 2023-02-03 PROCEDURE — 99999 PR PBB SHADOW E&M-EST. PATIENT-LVL III: CPT | Mod: PBBFAC,,, | Performed by: INTERNAL MEDICINE

## 2023-02-03 PROCEDURE — 3077F SYST BP >= 140 MM HG: CPT | Mod: CPTII,,, | Performed by: INTERNAL MEDICINE

## 2023-02-03 PROCEDURE — 3077F PR MOST RECENT SYSTOLIC BLOOD PRESSURE >= 140 MM HG: ICD-10-PCS | Mod: CPTII,,, | Performed by: INTERNAL MEDICINE

## 2023-02-03 PROCEDURE — 3008F PR BODY MASS INDEX (BMI) DOCUMENTED: ICD-10-PCS | Mod: CPTII,,, | Performed by: INTERNAL MEDICINE

## 2023-02-03 PROCEDURE — 99214 OFFICE O/P EST MOD 30 MIN: CPT | Mod: S$PBB,,, | Performed by: INTERNAL MEDICINE

## 2023-02-03 PROCEDURE — 1159F MED LIST DOCD IN RCRD: CPT | Mod: CPTII,,, | Performed by: INTERNAL MEDICINE

## 2023-02-03 PROCEDURE — 3008F BODY MASS INDEX DOCD: CPT | Mod: CPTII,,, | Performed by: INTERNAL MEDICINE

## 2023-02-03 PROCEDURE — 1160F RVW MEDS BY RX/DR IN RCRD: CPT | Mod: CPTII,,, | Performed by: INTERNAL MEDICINE

## 2023-02-03 PROCEDURE — 1159F PR MEDICATION LIST DOCUMENTED IN MEDICAL RECORD: ICD-10-PCS | Mod: CPTII,,, | Performed by: INTERNAL MEDICINE

## 2023-02-03 PROCEDURE — 99214 PR OFFICE/OUTPT VISIT, EST, LEVL IV, 30-39 MIN: ICD-10-PCS | Mod: S$PBB,,, | Performed by: INTERNAL MEDICINE

## 2023-02-03 PROCEDURE — 3080F PR MOST RECENT DIASTOLIC BLOOD PRESSURE >= 90 MM HG: ICD-10-PCS | Mod: CPTII,,, | Performed by: INTERNAL MEDICINE

## 2023-02-03 PROCEDURE — 99213 OFFICE O/P EST LOW 20 MIN: CPT | Mod: PBBFAC,PN | Performed by: INTERNAL MEDICINE

## 2023-02-03 PROCEDURE — 3080F DIAST BP >= 90 MM HG: CPT | Mod: CPTII,,, | Performed by: INTERNAL MEDICINE

## 2023-02-03 PROCEDURE — 1160F PR REVIEW ALL MEDS BY PRESCRIBER/CLIN PHARMACIST DOCUMENTED: ICD-10-PCS | Mod: CPTII,,, | Performed by: INTERNAL MEDICINE

## 2023-02-03 PROCEDURE — 99999 PR PBB SHADOW E&M-EST. PATIENT-LVL III: ICD-10-PCS | Mod: PBBFAC,,, | Performed by: INTERNAL MEDICINE

## 2023-02-03 NOTE — PROGRESS NOTES
Subjective:    Patient ID:  Fredrick Cruz III is a 47 y.o. male who presents for follow-up of Follow-up (3 month)        PCP: Valentin Sullivan MD     HPI  Pt is a 48 yo F w/ PMH of PALMA who presents for f/u of palpitations.  He was seen in the ED 10/20/2022 w/ c/o palpitations for 2 days and had a negative cardiac w/u in the ED and was d/c'd home w/ cardiology f/u.  He was last seen 11/3/2022 and had a holter and was referred to sleep med.  His holter was negative and has seen sleep med and awaiting sleep study.  He mentions that he has been doing well and denies further episodes of palpitations for the past 3 wks.   He denies cp, sob, edema, orthopnea, PND, presyncope, LOC, claudication.  He monitors his BP at home and states it runs 130-160s/70-90s.  He states that he was exercising at home regularly and denies limitations.      Past Medical History:   Diagnosis Date    Eczema     Fatty liver     Heart palpitations 2022     History reviewed. No pertinent surgical history.  Social History     Socioeconomic History    Marital status: Single   Tobacco Use    Smoking status: Former     Packs/day: 1.50     Years: 20.00     Pack years: 30.00     Types: Cigarettes     Quit date:      Years since quittin.0    Smokeless tobacco: Never     Family History   Problem Relation Age of Onset    COPD Mother     Hypertension Mother     Diabetes Father     Hepatitis Father        Review of patient's allergies indicates:   Allergen Reactions    Zosyn [piperacillin-tazobactam] Itching             Review of Systems   Constitutional: Negative for diaphoresis and fever.   HENT:  Negative for congestion and hearing loss.    Eyes:  Negative for blurred vision and pain.   Cardiovascular:  Negative for chest pain, claudication, dyspnea on exertion, leg swelling, near-syncope, palpitations and syncope.   Respiratory:  Positive for sleep disturbances due to breathing and snoring. Negative for shortness of breath.   "  Hematologic/Lymphatic: Negative for bleeding problem. Does not bruise/bleed easily.   Skin:  Negative for color change and poor wound healing.   Gastrointestinal:  Negative for abdominal pain and nausea.   Genitourinary:  Negative for bladder incontinence and flank pain.   Neurological:  Negative for focal weakness and light-headedness.      Objective:   BP (!) 144/90 (BP Location: Left arm, Patient Position: Sitting, BP Method: Medium (Manual))   Pulse 76   Ht 5' 9" (1.753 m)   Wt 102.1 kg (225 lb)   SpO2 98%   BMI 33.23 kg/m²    Physical Exam  Constitutional:       Appearance: He is well-developed. He is not diaphoretic.   HENT:      Head: Normocephalic and atraumatic.   Eyes:      General: No scleral icterus.     Pupils: Pupils are equal, round, and reactive to light.   Neck:      Vascular: No JVD.   Cardiovascular:      Rate and Rhythm: Normal rate and regular rhythm.      Pulses: Intact distal pulses.      Heart sounds: S1 normal and S2 normal. No murmur heard.    No friction rub. No gallop.   Pulmonary:      Effort: Pulmonary effort is normal. No respiratory distress.      Breath sounds: Normal breath sounds. No wheezing or rales.   Chest:      Chest wall: No tenderness.   Abdominal:      General: Bowel sounds are normal. There is no distension.      Palpations: Abdomen is soft. There is no mass.      Tenderness: There is no abdominal tenderness. There is no rebound.   Musculoskeletal:         General: No tenderness. Normal range of motion.      Cervical back: Normal range of motion and neck supple.   Skin:     General: Skin is warm and dry.      Coloration: Skin is not pale.   Neurological:      Mental Status: He is alert and oriented to person, place, and time.      Coordination: Coordination normal.      Deep Tendon Reflexes: Reflexes normal.   Psychiatric:         Behavior: Behavior normal.         Judgment: Judgment normal.         ECG: 10/20/2022- reviewed.  NSR, NL ECG.      48 hr Holter: " 11/14/2022- reviewed.    Conclusion    Sinus rhythm  Normal heart rate behavior  Very rare ectopy    Assessment:       1. Primary hypertension    2. Palpitations    3. Class 1 obesity due to excess calories with serious comorbidity and body mass index (BMI) of 33.0 to 33.9 in adult    4. At risk for sleep apnea         Plan:         Primary hypertension  Goal BP < 130/80.    - risk factor and lifestyle modification   - pt to continue to monitor BP at home and record    Palpitations  Resolved.  Noted to be mostly at night and pt w/ risk factors for sleep apnea.    - 48 hr holter negative for arrhythmia     Class 1 obesity due to excess calories with serious comorbidity and body mass index (BMI) of 33.0 to 33.9 in adult  Encouraged lifestyle modifications (diet, exercise, and weight loss).      At risk for sleep apnea  Followed by sleep medicine.        Total duration of face to face visit time 30 minutes.  Total time spent counseling greater than fifty percent of total visit time.  Counseling included discussion regarding imaging findings, diagnosis, possibilities, treatment options, risks and benefits.  The patient had many questions regarding the options and long-term effects      Collin Collins M.D.  Interventional Cardiology

## 2023-02-03 NOTE — ASSESSMENT & PLAN NOTE
Resolved.  Noted to be mostly at night and pt w/ risk factors for sleep apnea.    - 48 hr holter negative for arrhythmia

## 2023-02-06 ENCOUNTER — TELEPHONE (OUTPATIENT)
Dept: SLEEP MEDICINE | Facility: OTHER | Age: 48
End: 2023-02-06
Payer: MEDICAID

## 2023-02-09 ENCOUNTER — TELEPHONE (OUTPATIENT)
Dept: SLEEP MEDICINE | Facility: OTHER | Age: 48
End: 2023-02-09
Payer: MEDICAID

## 2023-02-10 ENCOUNTER — HOSPITAL ENCOUNTER (OUTPATIENT)
Dept: SLEEP MEDICINE | Facility: OTHER | Age: 48
Discharge: HOME | End: 2023-02-10
Attending: INTERNAL MEDICINE
Payer: MEDICAID

## 2023-02-10 DIAGNOSIS — I10 HYPERTENSION, UNSPECIFIED TYPE: ICD-10-CM

## 2023-02-10 DIAGNOSIS — Z91.89 AT RISK FOR SLEEP APNEA: ICD-10-CM

## 2023-02-10 DIAGNOSIS — R06.83 SNORING: ICD-10-CM

## 2023-02-10 DIAGNOSIS — F51.09 OTHER INSOMNIA NOT DUE TO A SUBSTANCE OR KNOWN PHYSIOLOGICAL CONDITION: ICD-10-CM

## 2023-02-10 DIAGNOSIS — G47.30 SLEEP APNEA, UNSPECIFIED TYPE: ICD-10-CM

## 2023-02-10 PROCEDURE — 95800 SLP STDY UNATTENDED: CPT

## 2023-02-14 ENCOUNTER — PATIENT MESSAGE (OUTPATIENT)
Dept: SLEEP MEDICINE | Facility: CLINIC | Age: 48
End: 2023-02-14

## 2023-02-14 PROCEDURE — 95806 PR SLEEP STUDY, UNATTENDED, SIMUL RECORD HR/O2 SAT/RESP FLOW/RESP EFFT: ICD-10-PCS | Mod: 26,,, | Performed by: INTERNAL MEDICINE

## 2023-02-14 PROCEDURE — 95806 SLEEP STUDY UNATT&RESP EFFT: CPT | Mod: 26,,, | Performed by: INTERNAL MEDICINE

## 2023-03-17 ENCOUNTER — PATIENT MESSAGE (OUTPATIENT)
Dept: RESEARCH | Facility: HOSPITAL | Age: 48
End: 2023-03-17
Payer: MEDICAID

## 2023-03-27 NOTE — SUBJECTIVE & OBJECTIVE
Past Medical History:   Diagnosis Date    Eczema        History reviewed. No pertinent surgical history.    Review of patient's allergies indicates:   Allergen Reactions    Zosyn [piperacillin-tazobactam] Itching       No current facility-administered medications on file prior to encounter.     No current outpatient medications on file prior to encounter.     Family History     Problem Relation (Age of Onset)    COPD Mother    Diabetes Father    Hepatitis Father    Hypertension Mother        Tobacco Use    Smoking status: Former Smoker     Packs/day: 1.50     Years: 20.00     Pack years: 30.00     Types: Cigarettes     Quit date:      Years since quittin.0    Smokeless tobacco: Never Used   Substance and Sexual Activity    Alcohol use: Not on file    Drug use: Not on file    Sexual activity: Not on file     Review of Systems   Constitutional: Negative for appetite change, chills, diaphoresis, fatigue and fever.   HENT: Positive for sore throat (resolved). Negative for congestion, rhinorrhea, sinus pressure and sneezing.    Respiratory: Positive for cough (mild, non productive).    Cardiovascular: Negative for chest pain, palpitations and leg swelling.   Gastrointestinal: Negative for abdominal distention, abdominal pain, diarrhea, nausea and vomiting.   Genitourinary: Negative for dysuria and frequency.   Musculoskeletal: Negative for arthralgias, back pain, gait problem, joint swelling and myalgias.   Skin: Positive for color change and wound. Negative for pallor and rash.   Neurological: Negative for dizziness, weakness, light-headedness, numbness and headaches.   Psychiatric/Behavioral: Negative for confusion. The patient is not nervous/anxious.      Objective:     Vital Signs (Most Recent):  Temp: 98.1 °F (36.7 °C) (22)  Pulse: 81 (22)  Resp: 18 (22 1829)  BP: 122/75 (22)  SpO2: 97 % (22) Vital Signs (24h Range):  Temp:  [98.1 °F (36.7 °C)-99.8 °F  (37.7 °C)] 98.1 °F (36.7 °C)  Pulse:  [81-87] 81  Resp:  [18] 18  SpO2:  [97 %-98 %] 97 %  BP: (122-154)/(75-86) 122/75     Weight: 99.8 kg (220 lb)  Body mass index is 32.49 kg/m².    Physical Exam  Vitals and nursing note reviewed.   Constitutional:       General: He is not in acute distress.     Appearance: He is obese. He is not ill-appearing, toxic-appearing or diaphoretic.   HENT:      Head: Normocephalic and atraumatic.      Nose: Nose normal.      Mouth/Throat:      Mouth: Mucous membranes are moist.   Eyes:      Pupils: Pupils are equal, round, and reactive to light.   Cardiovascular:      Rate and Rhythm: Normal rate and regular rhythm.      Heart sounds: No murmur heard.      Pulmonary:      Effort: Pulmonary effort is normal. No respiratory distress.      Breath sounds: No wheezing, rhonchi or rales.      Comments: Currently on room air  Abdominal:      General: Bowel sounds are normal. There is no distension.      Palpations: Abdomen is soft.      Tenderness: There is no abdominal tenderness. There is no guarding.   Genitourinary:     Comments: deferred  Musculoskeletal:         General: No swelling, tenderness or deformity. Normal range of motion.      Cervical back: Normal range of motion.   Skin:     General: Skin is warm and dry.      Capillary Refill: Capillary refill takes less than 2 seconds.      Findings: Erythema and lesion present.      Comments: Lesion of the right forearm with no surrounding erythema, no fluctuance, or warmth. Nontender to palpation. Eczematous lesion of right thumb. Red streak noted from right inner wrist up forearm to mid upper arm. No significant pain on palpation.   Neurological:      General: No focal deficit present.      Mental Status: He is alert and oriented to person, place, and time.      Sensory: No sensory deficit.      Motor: No weakness.   Psychiatric:         Mood and Affect: Mood normal.         Behavior: Behavior normal.           CRANIAL NERVES     CN  III, IV, VI   Pupils are equal, round, and reactive to light.                   Significant Labs:   All pertinent labs within the past 24 hours have been reviewed.  CBC:   Recent Labs   Lab 01/20/22 2114 01/20/22 2126   WBC 4.27  --    HGB 15.8  --    HCT 47.7 47     --      CMP: No results for input(s): NA, K, CL, CO2, GLU, BUN, CREATININE, CALCIUM, PROT, ALBUMIN, BILITOT, ALKPHOS, AST, ALT, ANIONGAP, EGFRNONAA in the last 48 hours.    Invalid input(s): ESTGFAFRICA  Lactic Acid:   Recent Labs   Lab 01/20/22 2114   LACTATE 0.7       Significant Imaging: I have reviewed all pertinent imaging results/findings within the past 24 hours.     Imaging Results          X-Ray Hand 3 view Right (Final result)  Result time 01/20/22 22:20:27    Final result by Scottie Randolph MD (01/20/22 22:20:27)                 Impression:      No obvious evidence of an acute injury or process involving the right hand.      Electronically signed by: Scottie Randolph  Date:    01/20/2022  Time:    22:20             Narrative:    EXAMINATION:  XR HAND COMPLETE 3 VIEW RIGHT    CLINICAL HISTORY:  streaking (wound on thumb and right above wrist if you can extend film for soft tissue eval);    TECHNIQUE:  PA, lateral, and oblique views of the right hand were performed.    COMPARISON:  None    FINDINGS:  Multiple views of the right hand indicates no apparent evidence of an acute fracture injury of the phalanges the metacarpal or carpal bones.  The articular surfaces are smooth.  No disruption of the cortices.  No indication of soft tissue swelling or foreign body.                                 No

## 2023-12-28 ENCOUNTER — TELEPHONE (OUTPATIENT)
Dept: CARDIOLOGY | Facility: CLINIC | Age: 48
End: 2023-12-28
Payer: MEDICAID

## 2023-12-28 NOTE — TELEPHONE ENCOUNTER
Patient was scheduled with Dr Jordan Randolph on 01/23/24 @ 320 pm.  Patient is experiencing palpitation and occasional shortness of breath. Patient denied chest pain, blurred vision or headaches.  ED precaution was advised.  Patient verbalized

## 2023-12-28 NOTE — TELEPHONE ENCOUNTER
----- Message from Chiki Lozano sent at 12/28/2023  8:31 AM CST -----  Pt would like a call back asa regarding scheduling a f/u appt.   He is a former pt of Dr. Collin Collins.    Pt can be reached at [phone].    Thanks

## 2024-01-02 ENCOUNTER — OFFICE VISIT (OUTPATIENT)
Dept: CARDIOLOGY | Facility: CLINIC | Age: 49
End: 2024-01-02
Payer: MEDICAID

## 2024-01-02 VITALS
SYSTOLIC BLOOD PRESSURE: 175 MMHG | HEART RATE: 60 BPM | WEIGHT: 233 LBS | HEIGHT: 69 IN | BODY MASS INDEX: 34.51 KG/M2 | DIASTOLIC BLOOD PRESSURE: 106 MMHG | OXYGEN SATURATION: 97 %

## 2024-01-02 DIAGNOSIS — R00.2 PALPITATIONS: ICD-10-CM

## 2024-01-02 DIAGNOSIS — I10 PRIMARY HYPERTENSION: Primary | ICD-10-CM

## 2024-01-02 PROCEDURE — 1159F MED LIST DOCD IN RCRD: CPT | Mod: CPTII,,, | Performed by: INTERNAL MEDICINE

## 2024-01-02 PROCEDURE — 99204 OFFICE O/P NEW MOD 45 MIN: CPT | Mod: S$PBB,,, | Performed by: INTERNAL MEDICINE

## 2024-01-02 PROCEDURE — 99213 OFFICE O/P EST LOW 20 MIN: CPT | Mod: PBBFAC,PN | Performed by: INTERNAL MEDICINE

## 2024-01-02 PROCEDURE — 3080F DIAST BP >= 90 MM HG: CPT | Mod: CPTII,,, | Performed by: INTERNAL MEDICINE

## 2024-01-02 PROCEDURE — 3008F BODY MASS INDEX DOCD: CPT | Mod: CPTII,,, | Performed by: INTERNAL MEDICINE

## 2024-01-02 PROCEDURE — 99999 PR PBB SHADOW E&M-EST. PATIENT-LVL III: CPT | Mod: PBBFAC,,, | Performed by: INTERNAL MEDICINE

## 2024-01-02 PROCEDURE — 1160F RVW MEDS BY RX/DR IN RCRD: CPT | Mod: CPTII,,, | Performed by: INTERNAL MEDICINE

## 2024-01-02 PROCEDURE — 3077F SYST BP >= 140 MM HG: CPT | Mod: CPTII,,, | Performed by: INTERNAL MEDICINE

## 2024-01-02 NOTE — PROGRESS NOTES
"[facility] [address]     SUBJECTIVE:     History of Present Illness:  Patient is a 48 y.o. male presents to establish cardiac care. Previously seen by . last seen 2/23    Primary Diagnosis:   Hypertension: positive  DM: none  Smoker: quit 10 years   Family history of early CAD; none  Heart disease: palpitations   SOHEILA   Obesity    ROS  Blood pressures elevated at home. Changed his diet - at home unclear  No chest pains  No shortness of breath with exertion; none at rest; no PND or orthopnea  Palpitations: feels a sinking feeling in his chest and when he checks his pulse, feels his heart stops and then starts. Has this daily; no tachycardia  Activity: walks; jogging without issues   Review of patient's allergies indicates:   Allergen Reactions    Zosyn [piperacillin-tazobactam] Itching       Past Medical History:   Diagnosis Date    Eczema     Fatty liver     Heart palpitations 07/13/2022       No past surgical history on file.        Past Hospitalization:         Cardiac meds:  none        OBJECTIVE:     Vital Signs (Most Recent)  Vitals:    01/02/24 1006   BP: (!) 175/106   Pulse: 60   SpO2: 97%   Weight: 105.7 kg (233 lb 0.4 oz)   Height: 5' 9" (1.753 m)         Physical Exam:  Neck: normal carotids, no bruits; normal JVP  Lungs :clear  Heart: RR, normal S1,S2, no murmurs, no gallops  Abd: no masses; no bruits;   Exts: normal DP and PT pulses bilaterally, normal radials; no edema noted               Labs  10/22: normal; CMP normal    Diagnostic Results:    1.EKG: 10/22: normal   2.Echo:   3. Stress test  4. cath  5. Holter: 11/22: benign -reported palpitations with no arrhythmias   Chart review:        ASSESSMENT/PLAN:     Palpitations: feels the skip of the  beat; most likely from extra systole;   Hypertension: unknown control  No angina or failure    Plan: 1. Monitor blood pressures normal  2. Reassurance  3. Return as needed     Jaida Otero MD    "

## 2024-05-02 ENCOUNTER — LAB VISIT (OUTPATIENT)
Dept: LAB | Facility: HOSPITAL | Age: 49
End: 2024-05-02
Payer: MEDICAID

## 2024-05-02 ENCOUNTER — OFFICE VISIT (OUTPATIENT)
Dept: PRIMARY CARE CLINIC | Facility: CLINIC | Age: 49
End: 2024-05-02
Payer: MEDICAID

## 2024-05-02 VITALS
WEIGHT: 231.5 LBS | HEART RATE: 73 BPM | TEMPERATURE: 98 F | HEIGHT: 69 IN | DIASTOLIC BLOOD PRESSURE: 110 MMHG | BODY MASS INDEX: 34.29 KG/M2 | SYSTOLIC BLOOD PRESSURE: 158 MMHG

## 2024-05-02 DIAGNOSIS — Z13.1 ENCOUNTER FOR SCREENING FOR DIABETES MELLITUS: ICD-10-CM

## 2024-05-02 DIAGNOSIS — I10 UNCONTROLLED HYPERTENSION: Primary | ICD-10-CM

## 2024-05-02 DIAGNOSIS — Z13.6 ENCOUNTER FOR SCREENING FOR CARDIOVASCULAR DISORDERS: ICD-10-CM

## 2024-05-02 DIAGNOSIS — I10 UNCONTROLLED HYPERTENSION: ICD-10-CM

## 2024-05-02 DIAGNOSIS — R79.89 LOW TESTOSTERONE IN MALE: ICD-10-CM

## 2024-05-02 DIAGNOSIS — Z00.00 ENCOUNTER FOR ANNUAL PHYSICAL EXAM: ICD-10-CM

## 2024-05-02 DIAGNOSIS — E66.09 CLASS 1 OBESITY DUE TO EXCESS CALORIES WITH SERIOUS COMORBIDITY AND BODY MASS INDEX (BMI) OF 34.0 TO 34.9 IN ADULT: ICD-10-CM

## 2024-05-02 LAB
ALBUMIN SERPL BCP-MCNC: 4.3 G/DL (ref 3.5–5.2)
ALP SERPL-CCNC: 79 U/L (ref 55–135)
ALT SERPL W/O P-5'-P-CCNC: 55 U/L (ref 10–44)
ANION GAP SERPL CALC-SCNC: 6 MMOL/L (ref 8–16)
AST SERPL-CCNC: 26 U/L (ref 10–40)
BASOPHILS # BLD AUTO: 0.06 K/UL (ref 0–0.2)
BASOPHILS NFR BLD: 0.9 % (ref 0–1.9)
BILIRUB SERPL-MCNC: 0.6 MG/DL (ref 0.1–1)
BUN SERPL-MCNC: 11 MG/DL (ref 6–20)
CALCIUM SERPL-MCNC: 8.9 MG/DL (ref 8.7–10.5)
CHLORIDE SERPL-SCNC: 103 MMOL/L (ref 95–110)
CHOLEST SERPL-MCNC: 209 MG/DL (ref 120–199)
CHOLEST/HDLC SERPL: 7.2 {RATIO} (ref 2–5)
CO2 SERPL-SCNC: 27 MMOL/L (ref 23–29)
CREAT SERPL-MCNC: 1 MG/DL (ref 0.5–1.4)
DIFFERENTIAL METHOD BLD: NORMAL
EOSINOPHIL # BLD AUTO: 0.1 K/UL (ref 0–0.5)
EOSINOPHIL NFR BLD: 2.1 % (ref 0–8)
ERYTHROCYTE [DISTWIDTH] IN BLOOD BY AUTOMATED COUNT: 12.6 % (ref 11.5–14.5)
EST. GFR  (NO RACE VARIABLE): >60 ML/MIN/1.73 M^2
ESTIMATED AVG GLUCOSE: 114 MG/DL (ref 68–131)
GLUCOSE SERPL-MCNC: 97 MG/DL (ref 70–110)
HBA1C MFR BLD: 5.6 % (ref 4–5.6)
HCT VFR BLD AUTO: 48.7 % (ref 40–54)
HDLC SERPL-MCNC: 29 MG/DL (ref 40–75)
HDLC SERPL: 13.9 % (ref 20–50)
HGB BLD-MCNC: 16.6 G/DL (ref 14–18)
IMM GRANULOCYTES # BLD AUTO: 0.01 K/UL (ref 0–0.04)
IMM GRANULOCYTES NFR BLD AUTO: 0.2 % (ref 0–0.5)
LDLC SERPL CALC-MCNC: 129.8 MG/DL (ref 63–159)
LYMPHOCYTES # BLD AUTO: 1.8 K/UL (ref 1–4.8)
LYMPHOCYTES NFR BLD: 27.6 % (ref 18–48)
MCH RBC QN AUTO: 29.7 PG (ref 27–31)
MCHC RBC AUTO-ENTMCNC: 34.1 G/DL (ref 32–36)
MCV RBC AUTO: 87 FL (ref 82–98)
MONOCYTES # BLD AUTO: 0.4 K/UL (ref 0.3–1)
MONOCYTES NFR BLD: 6 % (ref 4–15)
NEUTROPHILS # BLD AUTO: 4 K/UL (ref 1.8–7.7)
NEUTROPHILS NFR BLD: 63.2 % (ref 38–73)
NONHDLC SERPL-MCNC: 180 MG/DL
NRBC BLD-RTO: 0 /100 WBC
PLATELET # BLD AUTO: 184 K/UL (ref 150–450)
PMV BLD AUTO: 12.3 FL (ref 9.2–12.9)
POTASSIUM SERPL-SCNC: 4.4 MMOL/L (ref 3.5–5.1)
PROT SERPL-MCNC: 7.2 G/DL (ref 6–8.4)
RBC # BLD AUTO: 5.59 M/UL (ref 4.6–6.2)
SODIUM SERPL-SCNC: 136 MMOL/L (ref 136–145)
TRIGL SERPL-MCNC: 251 MG/DL (ref 30–150)
WBC # BLD AUTO: 6.34 K/UL (ref 3.9–12.7)

## 2024-05-02 PROCEDURE — 80061 LIPID PANEL: CPT

## 2024-05-02 PROCEDURE — 99214 OFFICE O/P EST MOD 30 MIN: CPT | Mod: PBBFAC,PN

## 2024-05-02 PROCEDURE — 3080F DIAST BP >= 90 MM HG: CPT | Mod: CPTII,,,

## 2024-05-02 PROCEDURE — 3008F BODY MASS INDEX DOCD: CPT | Mod: CPTII,,,

## 2024-05-02 PROCEDURE — 80053 COMPREHEN METABOLIC PANEL: CPT

## 2024-05-02 PROCEDURE — 82040 ASSAY OF SERUM ALBUMIN: CPT | Mod: 91

## 2024-05-02 PROCEDURE — 3077F SYST BP >= 140 MM HG: CPT | Mod: CPTII,,,

## 2024-05-02 PROCEDURE — 99386 PREV VISIT NEW AGE 40-64: CPT | Mod: S$PBB,,,

## 2024-05-02 PROCEDURE — 1159F MED LIST DOCD IN RCRD: CPT | Mod: CPTII,,,

## 2024-05-02 PROCEDURE — 84270 ASSAY OF SEX HORMONE GLOBUL: CPT

## 2024-05-02 PROCEDURE — 99999 PR PBB SHADOW E&M-EST. PATIENT-LVL IV: CPT | Mod: PBBFAC,,,

## 2024-05-02 PROCEDURE — 36415 COLL VENOUS BLD VENIPUNCTURE: CPT

## 2024-05-02 PROCEDURE — 85025 COMPLETE CBC W/AUTO DIFF WBC: CPT

## 2024-05-02 PROCEDURE — 83036 HEMOGLOBIN GLYCOSYLATED A1C: CPT

## 2024-05-02 RX ORDER — LOSARTAN POTASSIUM AND HYDROCHLOROTHIAZIDE 12.5; 5 MG/1; MG/1
1 TABLET ORAL DAILY
Qty: 30 TABLET | Refills: 2 | Status: SHIPPED | OUTPATIENT
Start: 2024-05-02 | End: 2024-07-31

## 2024-05-02 NOTE — PROGRESS NOTES
SUBJECTIVE     Chief Complaint   Patient presents with    Establish Care       HPI  Fredrick Cruz III is a 48 y.o. male with multiple medical diagnoses as listed in the medical history and problem list that presents to clinic to establish care. Pt is new to me. He has a good appetite and eats well. He does exercise, walks his dog. He sleeps for ~6-7 hours nightly. Pt does take OTC supplements, takes Vit B12, DHA/EPA supplements. He does have any current stressors, relieves stress by doing some breathing exercises. Pt is UTD on age appropriate CA screening. Pt reports fluid to calves for about 2 month, especially when sitting. Patient denies shortness of breath, chest pains, palpitations, headaches, vision changes, or dizziness at present time.     HPI  HTN -   Currently prescribed: Not taking any blood pressure medications.  Patient endorses not taking any medication.   Denies headaches,shortness of breath, vision changes, CP, palpitations, or other concerning symptoms at present time.  Due for microalbumin/creatinine ratio- ordered.   Most recent EKG on file 10/2022.     Patient reports history of palpitations for which he has been seen by cardiology.     Elevated BP readings during today's visit. Patient reports BP have been high in the past, states he is not taking any blood pressure medications. Patient states medications has been recommended, but he would rather not take any BP medication.        PAST MEDICAL HISTORY:  Past Medical History:   Diagnosis Date    Eczema     Fatty liver     Heart palpitations 07/13/2022       PAST SURGICAL HISTORY:  Past Surgical History:   Procedure Laterality Date    NO PAST SURGERIES         SOCIAL HISTORY:  Social History     Socioeconomic History    Marital status: Single   Tobacco Use    Smoking status: Former     Current packs/day: 0.00     Average packs/day: 1.5 packs/day for 20.0 years (30.0 ttl pk-yrs)     Types: Cigarettes     Start date: 1995     Quit date: 2015      Years since quittin.3    Smokeless tobacco: Never   Substance and Sexual Activity    Alcohol use: Not Currently    Drug use: Not Currently     Types: PCP    Sexual activity: Yes     Partners: Female     Social Determinants of Health     Financial Resource Strain: Patient Declined (2024)    Overall Financial Resource Strain (CARDIA)     Difficulty of Paying Living Expenses: Patient declined   Food Insecurity: Patient Declined (2024)    Hunger Vital Sign     Worried About Running Out of Food in the Last Year: Patient declined     Ran Out of Food in the Last Year: Patient declined   Transportation Needs: Patient Declined (2024)    PRAPARE - Transportation     Lack of Transportation (Medical): Patient declined     Lack of Transportation (Non-Medical): Patient declined   Physical Activity: Patient Declined (2024)    Exercise Vital Sign     Days of Exercise per Week: Patient declined     Minutes of Exercise per Session: Patient declined   Stress: Patient Declined (2024)    [employer] - Occupational Stress Questionnaire     Feeling of Stress : Patient declined   Housing Stability: Unknown (2024)    Housing Stability Vital Sign     Unable to Pay for Housing in the Last Year: Patient declined       FAMILY HISTORY:  Family History   Problem Relation Name Age of Onset    COPD Mother      Hypertension Mother      Diabetes Father      Hepatitis Father         ALLERGIES AND MEDICATIONS: updated and reviewed.  Review of patient's allergies indicates:   Allergen Reactions    Zosyn [piperacillin-tazobactam] Itching     Current Outpatient Medications   Medication Sig Dispense Refill    losartan-hydrochlorothiazide 50-12.5 mg (HYZAAR) 50-12.5 mg per tablet Take 1 tablet by mouth once daily. 30 tablet 2     No current facility-administered medications for this visit.       ROS  Review of Systems   Constitutional:  Negative for chills, fatigue and fever.   HENT:  Negative for  "congestion, facial swelling, rhinorrhea and sore throat.    Respiratory:  Negative for apnea, cough, chest tightness and shortness of breath.    Cardiovascular:  Positive for leg swelling. Negative for chest pain and palpitations.   Gastrointestinal:  Negative for constipation, diarrhea, nausea and vomiting.   Genitourinary:  Negative for difficulty urinating and dysuria.   Musculoskeletal:  Negative for back pain, joint swelling and neck pain.   Skin:  Negative for rash and wound.   Neurological:  Negative for dizziness, speech difficulty, light-headedness and headaches.   Psychiatric/Behavioral:  Negative for confusion, dysphoric mood and sleep disturbance. The patient is not nervous/anxious.        OBJECTIVE     Physical Exam  Vitals:    05/02/24 1151   BP: (!) 158/110   Pulse:    Temp:     Body mass index is 34.18 kg/m².  Weight: 105 kg (231 lb 7.7 oz)   Height: 5' 9" (175.3 cm)     Physical Exam  Vitals reviewed.   Constitutional:       General: He is awake. He is not in acute distress.     Appearance: Normal appearance. He is well-developed.   HENT:      Right Ear: External ear normal.      Left Ear: External ear normal.      Nose: Nose normal.      Mouth/Throat:      Mouth: Mucous membranes are moist.   Eyes:      Extraocular Movements: Extraocular movements intact.      Conjunctiva/sclera: Conjunctivae normal.      Pupils: Pupils are equal, round, and reactive to light.   Cardiovascular:      Rate and Rhythm: Normal rate and regular rhythm.      Pulses: Normal pulses.      Heart sounds: Normal heart sounds.   Pulmonary:      Effort: Pulmonary effort is normal.      Breath sounds: Normal breath sounds.   Abdominal:      General: Bowel sounds are normal. There is no distension.      Palpations: Abdomen is soft.   Musculoskeletal:         General: No swelling. Normal range of motion.      Cervical back: Normal range of motion.      Right lower leg: No edema.      Left lower leg: No edema.   Skin:     General: " Skin is warm and dry.      Findings: No rash.   Neurological:      General: No focal deficit present.      Mental Status: He is alert and oriented to person, place, and time.   Psychiatric:         Mood and Affect: Mood normal.         Behavior: Behavior normal. Behavior is cooperative.       Health Maintenance         Date Due Completion Date    Lipid Panel 07/09/2013 7/9/2008    TETANUS VACCINE 05/02/2025 (Originally 6/15/1994) ---    Colorectal Cancer Screening 05/02/2025 (Originally 6/15/2021) ---    COVID-19 Vaccine (1 - 2023-24 season) 05/02/2025 (Originally 9/1/2023) ---    Pneumococcal Vaccines (Age 0-64) (1 of 2 - PCV) 05/02/2025 (Originally 6/15/1982) ---    Influenza Vaccine (Season Ended) 09/01/2024 ---    Hemoglobin A1c (Diabetic Prevention Screening) 01/21/2025 1/21/2022              ASSESSMENT     48 y.o. male with     1. Uncontrolled hypertension    2. Class 1 obesity due to excess calories with serious comorbidity and body mass index (BMI) of 34.0 to 34.9 in adult    3. Encounter for annual physical exam    4. Encounter for screening for cardiovascular disorders    5. Encounter for screening for diabetes mellitus    6. Low testosterone in male        PLAN:     1. Uncontrolled hypertension  Ongoing. Ordered labs to check levels. Ordered losartan/HCTZ 50-12.5mg; patient states he rather not take medications. Counseled on the risks of uncontrolled blood pressures.  Advised to maintain a low Na diet(<2g/day), exercise, and keep BP log to present to next visit. Counseled patient on lifestyle changes, for example increasing exercise. Follow-up in 2 weeks.    - Comprehensive Metabolic Panel; Future  - CBC Auto Differential; Future  - Microalbumin/Creatinine Ratio, Urine; Future  - losartan-hydrochlorothiazide 50-12.5 mg (HYZAAR) 50-12.5 mg per tablet; Take 1 tablet by mouth once daily.  Dispense: 30 tablet; Refill: 2    2. Class 1 obesity due to excess calories with serious comorbidity and body mass index  (BMI) of 34.0 to 34.9 in adult  - Discussed importance of eating a prudent diet and exercising     3. Encounter for annual physical exam  - Discussed age and gender appropriate screenings at this visit and encouraged a healthy diet low in simple carbohydrates, and increased physical activity.  Counseled on medically appropriate vaccines based on age and current health condition.  Screening test reviewed and discussed with patient.      4. Encounter for screening for cardiovascular disorders  Due. Ordered.   - Lipid Panel; Future    5. Encounter for screening for diabetes mellitus  Due. Ordered.   - Hemoglobin A1C; Future    6. Low testosterone in male  Ordered panel to check levels.   - TESTOSTERONE PANEL; Future      Aleja Cartagena, MSN, APRN, FNP-C  [facility]  05/02/2024 11:50 AM        Follow up in about 2 weeks (around 5/16/2024).

## 2024-05-10 LAB
ALBUMIN SERPL-MCNC: 4.6 G/DL (ref 3.6–5.1)
SHBG SERPL-SCNC: 27 NMOL/L (ref 10–50)
TESTOST FREE SERPL-MCNC: 40.2 PG/ML (ref 46–224)
TESTOST SERPL-MCNC: 275 NG/DL (ref 250–1100)
TESTOSTERONE.FREE+WB SERPL-MCNC: 84.4 NG/DL

## 2024-05-21 ENCOUNTER — OFFICE VISIT (OUTPATIENT)
Dept: PRIMARY CARE CLINIC | Facility: CLINIC | Age: 49
End: 2024-05-21
Payer: MEDICAID

## 2024-05-21 ENCOUNTER — LAB VISIT (OUTPATIENT)
Dept: LAB | Facility: HOSPITAL | Age: 49
End: 2024-05-21
Payer: MEDICAID

## 2024-05-21 VITALS
HEART RATE: 65 BPM | BODY MASS INDEX: 32 KG/M2 | TEMPERATURE: 98 F | DIASTOLIC BLOOD PRESSURE: 83 MMHG | OXYGEN SATURATION: 96 % | HEIGHT: 69 IN | SYSTOLIC BLOOD PRESSURE: 126 MMHG | WEIGHT: 216.06 LBS

## 2024-05-21 DIAGNOSIS — R74.01 ELEVATED ALT MEASUREMENT: ICD-10-CM

## 2024-05-21 DIAGNOSIS — Z71.2 ENCOUNTER TO DISCUSS TEST RESULTS: ICD-10-CM

## 2024-05-21 DIAGNOSIS — R79.89 LOW TESTOSTERONE IN MALE: Primary | ICD-10-CM

## 2024-05-21 LAB
ALBUMIN SERPL BCP-MCNC: 4.6 G/DL (ref 3.5–5.2)
ALP SERPL-CCNC: 81 U/L (ref 55–135)
ALT SERPL W/O P-5'-P-CCNC: 41 U/L (ref 10–44)
AST SERPL-CCNC: 25 U/L (ref 10–40)
BILIRUB DIRECT SERPL-MCNC: 0.2 MG/DL (ref 0.1–0.3)
BILIRUB SERPL-MCNC: 0.7 MG/DL (ref 0.1–1)
HAV IGM SERPL QL IA: NORMAL
HBV CORE IGM SERPL QL IA: NORMAL
HBV SURFACE AG SERPL QL IA: NORMAL
HCV AB SERPL QL IA: NORMAL
PROT SERPL-MCNC: 7.4 G/DL (ref 6–8.4)

## 2024-05-21 PROCEDURE — 3008F BODY MASS INDEX DOCD: CPT | Mod: CPTII,,,

## 2024-05-21 PROCEDURE — 1159F MED LIST DOCD IN RCRD: CPT | Mod: CPTII,,,

## 2024-05-21 PROCEDURE — 3066F NEPHROPATHY DOC TX: CPT | Mod: CPTII,,,

## 2024-05-21 PROCEDURE — 80076 HEPATIC FUNCTION PANEL: CPT

## 2024-05-21 PROCEDURE — 3061F NEG MICROALBUMINURIA REV: CPT | Mod: CPTII,,,

## 2024-05-21 PROCEDURE — 99213 OFFICE O/P EST LOW 20 MIN: CPT | Mod: PBBFAC,PN

## 2024-05-21 PROCEDURE — 3074F SYST BP LT 130 MM HG: CPT | Mod: CPTII,,,

## 2024-05-21 PROCEDURE — 36415 COLL VENOUS BLD VENIPUNCTURE: CPT

## 2024-05-21 PROCEDURE — 99213 OFFICE O/P EST LOW 20 MIN: CPT | Mod: S$PBB,,,

## 2024-05-21 PROCEDURE — 3044F HG A1C LEVEL LT 7.0%: CPT | Mod: CPTII,,,

## 2024-05-21 PROCEDURE — 3079F DIAST BP 80-89 MM HG: CPT | Mod: CPTII,,,

## 2024-05-21 PROCEDURE — 80074 ACUTE HEPATITIS PANEL: CPT

## 2024-05-21 PROCEDURE — 99999 PR PBB SHADOW E&M-EST. PATIENT-LVL III: CPT | Mod: PBBFAC,,,

## 2024-05-21 NOTE — PROGRESS NOTES
SUBJECTIVE     Chief Complaint   Patient presents with    Follow-up       HPI  Fredrick Cruz III is a 49 y.o. male with multiple medical diagnoses as listed in the medical history and problem list that presents for follow-up blood pressure.     HPI   HTN -   Currently prescribed losartan/HCTZ 50-12.5 mg.  Patient endorses taking medication as directed.  Denies side effects or concerns while taking medication.  Patient not currently checking BP at home.  Denies headaches, vision changes, CP, palpitations, or other concerning symptoms.  Due for microalbumin/creatinine ratio. Ordered  Most recent EKG on file 10/20/2020.     Discussed recent lab results.     PAST MEDICAL HISTORY:  Past Medical History:   Diagnosis Date    Eczema     Fatty liver     Heart palpitations 07/13/2022       ALLERGIES AND MEDICATIONS: updated and reviewed.  Review of patient's allergies indicates:   Allergen Reactions    Zosyn [piperacillin-tazobactam] Itching     Current Outpatient Medications   Medication Sig Dispense Refill    losartan-hydrochlorothiazide 50-12.5 mg (HYZAAR) 50-12.5 mg per tablet Take 1 tablet by mouth once daily. 30 tablet 2     No current facility-administered medications for this visit.       ROS  Review of Systems   Constitutional:  Negative for activity change, chills, fatigue and fever.   HENT:  Negative for congestion, facial swelling, rhinorrhea and sore throat.    Respiratory:  Negative for cough, chest tightness and shortness of breath.    Cardiovascular:  Negative for chest pain and palpitations.   Gastrointestinal:  Negative for abdominal pain, constipation, diarrhea, nausea and vomiting.   Genitourinary:  Negative for dysuria.   Musculoskeletal:  Negative for back pain and joint swelling.   Skin:  Negative for rash and wound.   Neurological:  Negative for dizziness, speech difficulty, light-headedness and headaches.   Psychiatric/Behavioral:  Negative for behavioral problems, dysphoric mood and sleep  "disturbance. The patient is not nervous/anxious.          OBJECTIVE     Physical Exam  Vitals:    05/21/24 1359   BP: 126/83   Pulse: 65   Temp: 98.4 °F (36.9 °C)    Body mass index is 31.91 kg/m².  Weight: 98 kg (216 lb 0.8 oz)   Height: 5' 9" (175.3 cm)     Physical Exam  Vitals reviewed.   Constitutional:       General: He is not in acute distress.  HENT:      Right Ear: External ear normal.      Left Ear: External ear normal.      Nose: Nose normal.      Mouth/Throat:      Mouth: Mucous membranes are moist.   Eyes:      Extraocular Movements: Extraocular movements intact.      Conjunctiva/sclera: Conjunctivae normal.      Pupils: Pupils are equal, round, and reactive to light.   Cardiovascular:      Rate and Rhythm: Normal rate and regular rhythm.      Pulses: Normal pulses.      Heart sounds: Normal heart sounds.   Pulmonary:      Effort: Pulmonary effort is normal.      Breath sounds: Normal breath sounds.   Abdominal:      General: Bowel sounds are normal. There is no distension.      Palpations: Abdomen is soft.   Musculoskeletal:         General: No swelling. Normal range of motion.      Cervical back: Normal range of motion.   Skin:     General: Skin is warm and dry.      Findings: No rash.   Neurological:      General: No focal deficit present.      Mental Status: He is alert and oriented to person, place, and time.   Psychiatric:         Mood and Affect: Mood normal.         Behavior: Behavior normal.       Health Maintenance         Date Due Completion Date    TETANUS VACCINE 05/02/2025 (Originally 6/15/1994) ---    Colorectal Cancer Screening 05/02/2025 (Originally 6/15/2021) ---    COVID-19 Vaccine (1 - 2023-24 season) 05/02/2025 (Originally 9/1/2023) ---    Pneumococcal Vaccines (Age 0-64) (1 of 2 - PCV) 05/02/2025 (Originally 6/15/1982) ---    Influenza Vaccine (Season Ended) 09/01/2024 ---    Hemoglobin A1c (Diabetic Prevention Screening) 05/02/2027 5/2/2024    Lipid Panel 05/02/2029 5/2/2024      "         ASSESSMENT     49 y.o. male with     1. Low testosterone in male    2. Elevated ALT measurement    3. Encounter to discuss test results        PLAN:     1. Low testosterone in male  New. Refer to Urology for evaluation.   - Ambulatory referral/consult to Urology; Future    2. Elevated ALT measurement  New. Ordered labs to check levels.   - HEPATIC FUNCTION PANEL; Future  - HEPATITIS PANEL, ACUTE; Future    3. Encounter to discuss test results  - Discussed recent lab results   - All questions/concerns addressed   - Pt voiced understanding       BAKARI oLpez  [facility]  05/21/2024 2:11 PM        Follow up in about 3 months (around 8/21/2024).

## 2025-03-19 ENCOUNTER — PATIENT MESSAGE (OUTPATIENT)
Dept: PRIMARY CARE CLINIC | Facility: CLINIC | Age: 50
End: 2025-03-19
Payer: MEDICAID

## 2025-05-19 ENCOUNTER — ON-DEMAND VIRTUAL (OUTPATIENT)
Dept: URGENT CARE | Facility: CLINIC | Age: 50
End: 2025-05-19
Payer: MEDICAID

## 2025-05-19 ENCOUNTER — HOSPITAL ENCOUNTER (EMERGENCY)
Facility: HOSPITAL | Age: 50
Discharge: HOME | End: 2025-05-19
Attending: EMERGENCY MEDICINE
Payer: MEDICAID

## 2025-05-19 VITALS
RESPIRATION RATE: 15 BRPM | TEMPERATURE: 98 F | DIASTOLIC BLOOD PRESSURE: 74 MMHG | SYSTOLIC BLOOD PRESSURE: 114 MMHG | BODY MASS INDEX: 31.99 KG/M2 | WEIGHT: 216 LBS | HEIGHT: 69 IN | OXYGEN SATURATION: 94 % | HEART RATE: 59 BPM

## 2025-05-19 DIAGNOSIS — I10 HYPERTENSION: ICD-10-CM

## 2025-05-19 DIAGNOSIS — R03.0 ELEVATED BLOOD PRESSURE READING: Primary | ICD-10-CM

## 2025-05-19 LAB
ABSOLUTE EOSINOPHIL (OHS): 0.18 K/UL
ABSOLUTE MONOCYTE (OHS): 0.45 K/UL (ref 0.3–1)
ABSOLUTE NEUTROPHIL COUNT (OHS): 5.58 K/UL (ref 1.8–7.7)
ALBUMIN SERPL BCP-MCNC: 4.3 G/DL (ref 3.5–5.2)
ALP SERPL-CCNC: 69 UNIT/L (ref 40–150)
ALT SERPL W/O P-5'-P-CCNC: 47 UNIT/L (ref 10–44)
ANION GAP (OHS): 10 MMOL/L (ref 8–16)
AST SERPL-CCNC: 23 UNIT/L (ref 11–45)
BASOPHILS # BLD AUTO: 0.08 K/UL
BASOPHILS NFR BLD AUTO: 0.9 %
BILIRUB SERPL-MCNC: 0.5 MG/DL (ref 0.1–1)
BILIRUB UR QL STRIP.AUTO: NEGATIVE
BNP SERPL-MCNC: 26 PG/ML (ref 0–99)
BUN SERPL-MCNC: 10 MG/DL (ref 6–20)
CALCIUM SERPL-MCNC: 9.1 MG/DL (ref 8.7–10.5)
CHLORIDE SERPL-SCNC: 104 MMOL/L (ref 95–110)
CLARITY UR: CLEAR
CO2 SERPL-SCNC: 22 MMOL/L (ref 23–29)
COLOR UR AUTO: COLORLESS
CREAT SERPL-MCNC: 1 MG/DL (ref 0.5–1.4)
ERYTHROCYTE [DISTWIDTH] IN BLOOD BY AUTOMATED COUNT: 13 % (ref 11.5–14.5)
GFR SERPLBLD CREATININE-BSD FMLA CKD-EPI: >60 ML/MIN/1.73/M2
GLUCOSE SERPL-MCNC: 116 MG/DL (ref 70–110)
GLUCOSE UR QL STRIP: NEGATIVE
HCT VFR BLD AUTO: 48.4 % (ref 40–54)
HGB BLD-MCNC: 17 GM/DL (ref 14–18)
HGB UR QL STRIP: NEGATIVE
HOLD SPECIMEN: NORMAL
IMM GRANULOCYTES # BLD AUTO: 0.06 K/UL (ref 0–0.04)
IMM GRANULOCYTES NFR BLD AUTO: 0.7 % (ref 0–0.5)
KETONES UR QL STRIP: NEGATIVE
LEUKOCYTE ESTERASE UR QL STRIP: NEGATIVE
LYMPHOCYTES # BLD AUTO: 2.15 K/UL (ref 1–4.8)
MCH RBC QN AUTO: 29.2 PG (ref 27–31)
MCHC RBC AUTO-ENTMCNC: 35.1 G/DL (ref 32–36)
MCV RBC AUTO: 83 FL (ref 82–98)
NITRITE UR QL STRIP: NEGATIVE
NUCLEATED RBC (/100WBC) (OHS): 0 /100 WBC
PH UR STRIP: 7 [PH]
PLATELET # BLD AUTO: 187 K/UL (ref 150–450)
PMV BLD AUTO: 11.2 FL (ref 9.2–12.9)
POTASSIUM SERPL-SCNC: 4.1 MMOL/L (ref 3.5–5.1)
PROT SERPL-MCNC: 7.6 GM/DL (ref 6–8.4)
PROT UR QL STRIP: NEGATIVE
RBC # BLD AUTO: 5.82 M/UL (ref 4.6–6.2)
RELATIVE EOSINOPHIL (OHS): 2.1 %
RELATIVE LYMPHOCYTE (OHS): 25.3 % (ref 18–48)
RELATIVE MONOCYTE (OHS): 5.3 % (ref 4–15)
RELATIVE NEUTROPHIL (OHS): 65.7 % (ref 38–73)
SODIUM SERPL-SCNC: 136 MMOL/L (ref 136–145)
SP GR UR STRIP: 1.01
TROPONIN I SERPL DL<=0.01 NG/ML-MCNC: <0.006 NG/ML
UROBILINOGEN UR STRIP-ACNC: NEGATIVE EU/DL
WBC # BLD AUTO: 8.5 K/UL (ref 3.9–12.7)

## 2025-05-19 PROCEDURE — 93010 ELECTROCARDIOGRAM REPORT: CPT | Mod: ,,, | Performed by: INTERNAL MEDICINE

## 2025-05-19 PROCEDURE — 81003 URINALYSIS AUTO W/O SCOPE: CPT | Performed by: NURSE PRACTITIONER

## 2025-05-19 PROCEDURE — 84484 ASSAY OF TROPONIN QUANT: CPT | Performed by: NURSE PRACTITIONER

## 2025-05-19 PROCEDURE — 93005 ELECTROCARDIOGRAM TRACING: CPT

## 2025-05-19 PROCEDURE — 83880 ASSAY OF NATRIURETIC PEPTIDE: CPT | Performed by: NURSE PRACTITIONER

## 2025-05-19 PROCEDURE — 25000003 PHARM REV CODE 250: Performed by: NURSE PRACTITIONER

## 2025-05-19 PROCEDURE — 99285 EMERGENCY DEPT VISIT HI MDM: CPT | Mod: 25

## 2025-05-19 PROCEDURE — 80053 COMPREHEN METABOLIC PANEL: CPT | Performed by: NURSE PRACTITIONER

## 2025-05-19 PROCEDURE — 85025 COMPLETE CBC W/AUTO DIFF WBC: CPT | Performed by: NURSE PRACTITIONER

## 2025-05-19 RX ORDER — AMLODIPINE BESYLATE 5 MG/1
10 TABLET ORAL DAILY
Qty: 30 TABLET | Refills: 0 | Status: SHIPPED | OUTPATIENT
Start: 2025-05-19 | End: 2026-05-19

## 2025-05-19 RX ORDER — CLONIDINE HYDROCHLORIDE 0.1 MG/1
0.2 TABLET ORAL
Status: COMPLETED | OUTPATIENT
Start: 2025-05-19 | End: 2025-05-19

## 2025-05-19 RX ADMIN — CLONIDINE HYDROCHLORIDE 0.2 MG: 0.1 TABLET ORAL at 08:05

## 2025-05-19 NOTE — ED NOTES
Patient presented to the ED with complaints of high blood pressure since he got up this morning, states he has headache since last night but it was little bad this  morning and he checked his blood pressure it was 192/135, patient reports headache 4/10 at this time, describes as pressure to the frontal head and the bridge of the nose,  blood pressure still high, no chest pain, no nausea, vomiting, no fever, chills, no other complaints at this time, patient is alert, oriented x 4, connected to the monitor, family member at bedside.

## 2025-05-19 NOTE — DISCHARGE INSTRUCTIONS
Take amlodipine 5mg each day without fail.    Return to the Emergency Department for any worsening, change in condition, or any emergent concerns.

## 2025-05-19 NOTE — ED PROVIDER NOTES
Encounter Date: 5/19/2025       History     Chief Complaint   Patient presents with    Hypertension     Pt states has been having a headache for a few days and its been progressively getting worse. Pt states took bp at home and it was real high. Pt current bp 172/121 in triage. Pt denies any chest pain and does not take anything for blood pressure     Patient is a 49-year-old male known to have elevated blood pressures previously however no official diagnosis of hypertension has been made.  He endorses 1.5 days of headache that hurts worse across the bridge of the nose and at the temples it is intermittent and feels like pressure.  He reports it is worse when lying down better when standing.  Reports he feels that it does move around the head and also to the neck.  Current severity pain is 4/10.  Denies fever blurred vision dizziness congestion runny nose cough wheezing sputum production chest pain nausea vomiting endorses sinus pressure.  States he has taken no medications for this problem.  Blood pressure at home was 192/127.  Patient declined medications that previous encounters for hypertension stating he wished to try lifestyle modification x2 separate appointments.  On the 3rd appointment for the same complaint he was prescribed losartan HCT but stated he would rather not take the medication.    The history is provided by the patient. No  was used.     Review of patient's allergies indicates:   Allergen Reactions    Zosyn [piperacillin-tazobactam] Itching     Past Medical History:   Diagnosis Date    Eczema     Fatty liver     Heart palpitations 07/13/2022     Past Surgical History:   Procedure Laterality Date    NO PAST SURGERIES       Family History   Problem Relation Name Age of Onset    COPD Mother      Hypertension Mother      Diabetes Father      Hepatitis Father       Social History[1]  Review of Systems   Neurological:  Positive for headaches.       Physical Exam     Initial Vitals  [05/19/25 0632]   BP Pulse Resp Temp SpO2   (!) 172/121 79 18 97.8 °F (36.6 °C) 99 %      MAP       --         Physical Exam    Nursing note and vitals reviewed.  Constitutional: He appears well-developed and well-nourished. He is not diaphoretic. No distress. He is not intubated.   HENT:   Head: Normocephalic and atraumatic.   Right Ear: External ear normal.   Left Ear: External ear normal.   Nose: Nose normal.   Eyes: Pupils are equal, round, and reactive to light. Right eye exhibits no discharge. Left eye exhibits no discharge. No scleral icterus.   Neck:   Normal range of motion.  Cardiovascular:  Regular rhythm, S1 normal, S2 normal and normal heart sounds.     Exam reveals no gallop.       No murmur heard.  Neg for leg swelling   Pulmonary/Chest: Effort normal and breath sounds normal. No accessory muscle usage. No apnea, no tachypnea and no bradypnea. He is not intubated. No respiratory distress. He has no decreased breath sounds. He has no wheezes. He has no rhonchi. He has no rales.   Abdominal: He exhibits no distension.   Musculoskeletal:         General: Normal range of motion.      Cervical back: Normal range of motion.     Neurological: He is alert and oriented to person, place, and time.   Skin: Skin is dry. Capillary refill takes less than 2 seconds.         ED Course   Procedures  Labs Reviewed   COMPREHENSIVE METABOLIC PANEL - Abnormal       Result Value    Sodium 136      Potassium 4.1      Chloride 104      CO2 22 (*)     Glucose 116 (*)     BUN 10      Creatinine 1.0      Calcium 9.1      Protein Total 7.6      Albumin 4.3      Bilirubin Total 0.5      ALP 69      AST 23      ALT 47 (*)     Anion Gap 10      eGFR >60     URINALYSIS, REFLEX TO URINE CULTURE - Abnormal    Color, UA Colorless (*)     Appearance, UA Clear      pH, UA 7.0      Spec Grav UA 1.010      Protein, UA Negative      Glucose, UA Negative      Ketones, UA Negative      Bilirubin, UA Negative      Blood, UA Negative       Nitrites, UA Negative      Urobilinogen, UA Negative      Leukocyte Esterase, UA Negative     CBC WITH DIFFERENTIAL - Abnormal    WBC 8.50      RBC 5.82      HGB 17.0      HCT 48.4      MCV 83      MCH 29.2      MCHC 35.1      RDW 13.0      Platelet Count 187      MPV 11.2      Nucleated RBC 0      Neut % 65.7      Lymph % 25.3      Mono % 5.3      Eos % 2.1      Basophil % 0.9      Imm Grans % 0.7 (*)     Neut # 5.58      Lymph # 2.15      Mono # 0.45      Eos # 0.18      Baso # 0.08      Imm Grans # 0.06 (*)    TROPONIN I - Normal    Troponin-I <0.006     B-TYPE NATRIURETIC PEPTIDE - Normal    BNP 26     CBC W/ AUTO DIFFERENTIAL    Narrative:     The following orders were created for panel order CBC auto differential.  Procedure                               Abnormality         Status                     ---------                               -----------         ------                     CBC with Differential[[id number]]       Abnormal            Final result                 Please view results for these tests on the individual orders.   GREY TOP URINE HOLD    Extra Tube Hold for add-ons.          ECG Results              EKG 12-lead (Preliminary result)  Result time 05/19/25 08:36:08      Wet Read by Jimbo Majaon DNP (05/19/25 08:36:08, [facility], Emergency Medicine)    Independent EKG interpretation of the study dated May 19, 2025 at 08:20.  Normal sinus rhythm with PVCs (unifocal) no ST-elevation or depression.  Good precordial lead progression.  There is an RR prime in lead AVF.  Non emergent EKG of good quality.                                  Imaging Results              X-Ray Chest PA And Lateral (Final result)  Result time 05/19/25 09:55:06      Final result by Audie Ramirez MD (05/19/25 09:55:06)                   Impression:      As above.      Electronically signed by: Audie Ramirez MD  Date:    05/19/2025  Time:    09:55               Narrative:    EXAMINATION:  XR CHEST PA AND  LATERAL    CLINICAL HISTORY:  Essential (primary) hypertension    TECHNIQUE:  PA and lateral views of the chest were performed.    FINDINGS:  The lungs are clear.  There is no pneumothorax or pleural fluid.  The cardiac silhouette is not enlarged.  The osseous structures demonstrate degenerative change.                                       Medications   cloNIDine tablet 0.2 mg (0.2 mg Oral Given 5/19/25 0834)     Medical Decision Making  Patient is a 49-year-old male known to have elevated blood pressures previously however no official diagnosis of hypertension has been made.  He endorses 1.5 days of headache that hurts worse across the bridge of the nose and at the temples it is intermittent and feels like pressure.  He reports it is worse when lying down better when standing.  Reports he feels that it does move around the head and also to the neck.  Current severity pain is 4/10.  Denies fever blurred vision dizziness congestion runny nose cough wheezing sputum production chest pain nausea vomiting endorses sinus pressure.  States he has taken no medications for this problem.  Blood pressure at home was 192/127.  Patient declined medications that previous encounters for hypertension stating he wished to try lifestyle modification x2 separate appointments.  On the 3rd appointment for the same complaint he was prescribed losartan HCT but stated he would rather not take the medication.    On physical exam the patient is afebrile nontoxic in no apparent distress breath sounds are clear to auscultation heart sounds without abnormality skin warm dry and intact normal neurologic exam of the cranial nerves 2-12 as well as the extremities.  Negative cerebellar symptoms.  There was no leg swelling or edema at this time.      Differential diagnosis includes uncontrolled hypertension, medical noncompliance, hypertensive urgency or emergency, CVA, TIA    Problems Addressed:  Hypertension: chronic illness or injury with  "exacerbation, progression, or side effects of treatment     Details: Pts bp responded a bit too well with catapres 0.2.  Map reduction was pronounced, although he was reclined and had been napping.  I kept him a bit longer to ensure bp would not fall more deeply and to ensure neuro status.  He was unchanged and voiced readiness for discharge.  Understood he must take amlodipine as prescribed without fail and f/u with his pcp.    Amount and/or Complexity of Data Reviewed  Labs: ordered. Decision-making details documented in ED Course.  Radiology: ordered. Decision-making details documented in ED Course.  Discussion of management or test interpretation with external provider(s): Vital signs at the time of disposition were:  /73   Pulse (!) 59   Temp 97.8 °F (36.6 °C)   Resp 15   Ht 5' 9" (1.753 m)   Wt 98 kg (216 lb)   SpO2 (!) 94%   BMI 31.90 kg/m²       See AVS for additional recommendations. Medications listed herein were prescribed after reviewing the patient's allergies, medication list, history, most recent laboratories as available.  Referrals below were provided after reviewing the patient's previous medical providers. He understands he  should return for any worsening or changes in condition.  Prior to discharge the patient was asked if he  had any additional concerns or complaints and he declined. The patient was given an opportunity to ask questions and all were answered to his satisfaction.     Risk  Prescription drug management.  Diagnosis or treatment significantly limited by social determinants of health.  Risk Details: I have discussed this patient with Dr. Eric Robles and he concurs with my diagnostic and treatment plan.                  ED Course as of 05/19/25 1123   Mon May 19, 2025   0752 BP(!): 164/106 [VC]   0752 Temp: 97.8 °F (36.6 °C) [VC]   0752 Pulse: 79 [VC]   0752 Resp: 18 [VC]   0752 SpO2: 99 % [VC]   0833 BP(!): 187/135 [VC]   0841 CBC auto differential(!)  Normal cbc   " [VC]   0841 Pulse: 60 [VC]   0841 Resp: 17 [VC]   0841 SpO2: 95 %  Goal bp is 116 [VC]   0912 BNP: 26 [VC]   0912 Troponin I: <0.006 [VC]   0913 Comprehensive metabolic panel(!)  Normal cmp with exception of elevated alt. [VC]   0936 Urinalysis, Reflex to Urine Culture Urine, Clean Catch(!)  Neg for uti. [VC]   0936 BP(!): 189/116 [VC]   0936 Pulse: 61 [VC]   0936 Resp: 18 [VC]   0936 SpO2: 95 % [VC]   1017 X-Ray Chest PA And Lateral  The lungs are clear.  There is no pneumothorax or pleural fluid.  The cardiac silhouette is not enlarged.  The osseous structures demonstrate degenerative change. [VC]   1047 Pt resting quietly, no c/o at this time, will keep until 1130 to ensure bp does not reduce further. [VC]   1058 BP: 113/73 [VC]   1058 MAP (mmHg): 89 [VC]      ED Course User Index  [VC] Jimbo Majano DNP                           Clinical Impression:  Final diagnoses:  [I10] Hypertension          ED Disposition Condition    Discharge Stable          ED Prescriptions       Medication Sig Dispense Start Date End Date Auth. Provider    amLODIPine (NORVASC) 5 MG tablet Take 2 tablets (10 mg total) by mouth once daily. 30 tablet 5/19/2025 5/19/2026 Jimbo Majano DNP          Follow-up Information       Follow up With Specialties Details Why Contact Info    Aleja Cartagena FNP Internal Medicine Schedule an appointment as soon as possible for a visit   [address] [facility] [address]  [phone]                   [1]   Social History  Tobacco Use    Smoking status: Former     Current packs/day: 0.00     Average packs/day: 1.5 packs/day for 20.0 years (30.0 ttl pk-yrs)     Types: Cigarettes     Start date: 1995     Quit date: 2015     Years since quitting: 10.3    Smokeless tobacco: Never   Substance Use Topics    Alcohol use: Not Currently    Drug use: Not Currently     Types: PCP        Jimbo Majano DNP  05/19/25 6706

## 2025-05-20 NOTE — PROGRESS NOTES
"diabetic  Subjective:      Patient ID: Fredrick Cruz III is a 49 y.o. male.    Vitals:  vitals were not taken for this visit.     Chief Complaint: Hypertension      Visit Type: TELE AUDIOVISUAL - This visit was conducted virtually based on  subjective information and limited objective exam    Present with the patient at the time of consultation: TELEMED PRESENT WITH PATIENT: None  LOCATION OF PATIENT [place], [place]   Two patient identifiers used to verify patient- saying out date of birth and full name.       Past Medical History:   Diagnosis Date    Eczema     Fatty liver     Heart palpitations 07/13/2022     Past Surgical History:   Procedure Laterality Date    NO PAST SURGERIES       Review of patient's allergies indicates:   Allergen Reactions    Zosyn [piperacillin-tazobactam] Itching     Medications Ordered Prior to Encounter[1]  Family History   Problem Relation Name Age of Onset    COPD Mother      Hypertension Mother      Diabetes Father      Hepatitis Father             Ohs Peq Odvv Intake    5/19/2025  9:43 PM CDT - Filed by Patient   What is your current physical address in the event of a medical emergency? [address] [place] [address]   Are you able to take your vital signs? Yes   Systolic Blood Pressure: 161   Diastolic Blood Pressure: 113   Weight: 238   Height: 69   Pulse: 68   Temperature:    Respiration rate:    Pulse Oxygen:    Please attach any relevant images or files    Is your employer contracted with [facility]? No         48 yo male with BP concerns. Patient evaluated in ED this morning for elevated blood pressure. Patient reports known history of HTN however he was off medications for "some time." Per chart review SBP 190s on ED arrival. He received 0.2 mg clonidine with improvement. Patient discharged home with amlodipine 10 mg daily. He reports taking BP prior to call and it "seems to be going back up," currently 160s/100. Patient denies associated chest pain, " "SOB, dizziness and visual disturbance. He reports "mild" headache. He has taken 5 mg of prescribed amlodipine. He is  concerned about taking full 10 mg dose of amlodipine. He is requesting prescription for clonidine.             Cardiovascular:  Negative for chest pain.   Respiratory:  Negative for shortness of breath.    Neurological:  Positive for headaches. Negative for dizziness, numbness and tingling.        Objective:   The physical exam was conducted virtually.    AAO x 3 ; no acute distress noted; appearance normal; mood and behavior normal; thought process normal  Head- normocephalic  Nose- appears normal, no discharge or erythema  Eyes- pupils appear normal in size, no drainage, no erythema  Ears- normal appearing; no discharge, no erythema  Mouth- appears normal  Oropharynx- no erythema, lesions  Lungs- breathing at a normal rate, no acute distress noted  Heart- no reports of tachycardia, palpitations, chest pain  Abdomen- non distended, non tender reported by patient  Skin- warm and dry, no erythema or edema noted by patient or visualized        Assessment:     1. Elevated blood pressure reading        Plan:   Recommend 10 mg amlodipine as prescribed. Will not prescribed clonidine at this time. Recommend close follow up with primary care. ED precautions reviewed.       Thank you for choosing [facility]!    Our goal in the [facility] is to always provide outstanding medical care. You may receive a survey by mail or e-mail in the next week regarding your experience today. We would greatly appreciate you completing and returning the survey. Your feedback provides us with a way to recognize our staff who provide very good care, and it helps us learn how to improve when your experience was below our aspiration of excellence.         We appreciate you trusting us with your medical care. We hope you feel better soon. We will be happy to take care of you for all of your " future medical needs.    You must understand that you've received an Urgent Care treatment only and that you may be released before all your medical problems are known or treated. You, the patient, will arrange for follow up care as instructed.    Follow up with your PCP or specialty clinic as directed in the next 1-2 weeks if not improved or as needed.  You can call [phone] to schedule an appointment with the appropriate provider.    If your condition worsens we recommend that you receive another evaluation in person, with your primary care provider, urgent care or at the emergency room immediately or contact your primary medical clinics after hours call service to discuss your concerns.         Elevated blood pressure reading                          [1]   Current Outpatient Medications on File Prior to Visit   Medication Sig Dispense Refill    amLODIPine (NORVASC) 5 MG tablet Take 2 tablets (10 mg total) by mouth once daily. 30 tablet 0     No current facility-administered medications on file prior to visit.

## 2025-05-21 LAB
OHS QRS DURATION: 104 MS
OHS QTC CALCULATION: 441 MS

## 2025-05-27 ENCOUNTER — OFFICE VISIT (OUTPATIENT)
Dept: PRIMARY CARE CLINIC | Facility: CLINIC | Age: 50
End: 2025-05-27
Payer: MEDICAID

## 2025-05-27 VITALS
BODY MASS INDEX: 34.15 KG/M2 | SYSTOLIC BLOOD PRESSURE: 125 MMHG | DIASTOLIC BLOOD PRESSURE: 88 MMHG | WEIGHT: 231.25 LBS | HEART RATE: 77 BPM

## 2025-05-27 DIAGNOSIS — E66.09 CLASS 1 OBESITY DUE TO EXCESS CALORIES WITH SERIOUS COMORBIDITY AND BODY MASS INDEX (BMI) OF 34.0 TO 34.9 IN ADULT: ICD-10-CM

## 2025-05-27 DIAGNOSIS — R20.2 PARESTHESIA: ICD-10-CM

## 2025-05-27 DIAGNOSIS — I10 PRIMARY HYPERTENSION: Primary | ICD-10-CM

## 2025-05-27 DIAGNOSIS — G47.09 OTHER INSOMNIA: ICD-10-CM

## 2025-05-27 DIAGNOSIS — I49.3 FREQUENT PVCS: ICD-10-CM

## 2025-05-27 DIAGNOSIS — E66.811 CLASS 1 OBESITY DUE TO EXCESS CALORIES WITH SERIOUS COMORBIDITY AND BODY MASS INDEX (BMI) OF 34.0 TO 34.9 IN ADULT: ICD-10-CM

## 2025-05-27 PROCEDURE — 99213 OFFICE O/P EST LOW 20 MIN: CPT | Mod: PBBFAC,PN

## 2025-05-27 PROCEDURE — 99999 PR PBB SHADOW E&M-EST. PATIENT-LVL III: CPT | Mod: PBBFAC,,,

## 2025-05-27 PROCEDURE — G0447 BEHAVIOR COUNSEL OBESITY 15M: HCPCS | Mod: PBBFAC,PN

## 2025-05-27 RX ORDER — AMLODIPINE BESYLATE 5 MG/1
10 TABLET ORAL DAILY
Qty: 60 TABLET | Refills: 1 | Status: SHIPPED | OUTPATIENT
Start: 2025-05-27

## 2025-05-27 NOTE — PROGRESS NOTES
SUBJECTIVE     Chief Complaint   Patient presents with    Hypertension     Follow up from ER visit        HPI  Fredrick Cruz III is a 49 y.o. male with multiple medical diagnoses as listed in the medical history and problem list that presents for follow-up ER visit and blood pressure     HPI   History of Present Illness    CHIEF COMPLAINT:  Patient presents today for follow up after recent hospitalization on 05/19/2025 for severely elevated blood pressure. Patient was discharged the same day.     HYPERTENSION:  He was started on Amlodipine following hospitalization, initially at 5mg and increased to 10mg when blood pressure began to rise. He experiences occasional mild headaches with blood pressure elevation. He also reports circulation issues with coldness and tingling sensations in feet and hands, particularly when elevated.    CARDIOVASCULAR:  He was evaluated by cardiology approximately 1.5 years ago for palpitations. He reports he experiences PVCs, most noticeable during periods of relaxation (particularly when lying down or watching television) and less noticeable during periods of activity.    SLEEP:  He reports borderline results on sleep study. He does not sleep well and never feels fully rested upon morning awakening.    ALLERGIES:  He has history of adverse reactions to IV antibiotics, including Red Man's syndrome with vancomycin and hives with zosyn.    SOCIAL HISTORY:  He quit smoking 10 years ago and denies current alcohol, tobacco, or illicit drug use.    Patient denies any shortness of breath, dizziness, headaches, N/V, vision changes, chest pains, or palpitations at present time.          PAST MEDICAL HISTORY:  Past Medical History:   Diagnosis Date    Eczema     Fatty liver     Heart palpitations 07/13/2022       ALLERGIES AND MEDICATIONS: updated and reviewed.  Review of patient's allergies indicates:   Allergen Reactions    Zosyn [piperacillin-tazobactam] Itching     Current Outpatient  Medications   Medication Sig Dispense Refill    amLODIPine (NORVASC) 5 MG tablet Take 2 tablets (10 mg total) by mouth once daily. 60 tablet 1     No current facility-administered medications for this visit.     ROS  Review of Systems   Constitutional:  Negative for activity change, chills, fatigue and fever.   HENT:  Negative for congestion, facial swelling, rhinorrhea and sore throat.    Respiratory:  Negative for cough, chest tightness and shortness of breath.    Cardiovascular:  Negative for chest pain and palpitations.   Gastrointestinal:  Negative for abdominal pain, constipation, diarrhea, nausea and vomiting.   Genitourinary:  Negative for dysuria.   Musculoskeletal:  Negative for back pain and joint swelling.   Skin:  Negative for rash and wound.   Neurological:  Negative for dizziness, speech difficulty, light-headedness and headaches.   Psychiatric/Behavioral:  Negative for behavioral problems, dysphoric mood and sleep disturbance. The patient is not nervous/anxious.        OBJECTIVE     Physical Exam  Vitals:    05/27/25 1106   BP: 125/88   Pulse: 77    Body mass index is 34.15 kg/m².  Weight: 104.9 kg (231 lb 4.2 oz)         Physical Exam  Vitals reviewed.   Constitutional:       General: He is not in acute distress.  HENT:      Right Ear: External ear normal.      Left Ear: External ear normal.      Nose: Nose normal.      Mouth/Throat:      Mouth: Mucous membranes are moist.   Eyes:      Extraocular Movements: Extraocular movements intact.      Conjunctiva/sclera: Conjunctivae normal.      Pupils: Pupils are equal, round, and reactive to light.   Cardiovascular:      Rate and Rhythm: Normal rate and regular rhythm.      Pulses: Normal pulses.      Heart sounds: Normal heart sounds.   Pulmonary:      Effort: Pulmonary effort is normal.      Breath sounds: Normal breath sounds.   Abdominal:      General: Bowel sounds are normal. There is no distension.      Palpations: Abdomen is soft.    Musculoskeletal:         General: No swelling. Normal range of motion.      Cervical back: Normal range of motion.   Skin:     General: Skin is warm and dry.      Findings: No rash.   Neurological:      General: No focal deficit present.      Mental Status: He is alert and oriented to person, place, and time.   Psychiatric:         Mood and Affect: Mood normal.         Behavior: Behavior normal.         Thought Content: Thought content normal.         Judgment: Judgment normal.       Health Maintenance         Date Due Completion Date    TETANUS VACCINE Never done ---    Pneumococcal Vaccines (Age 0-49) (1 of 2 - PCV) Never done ---    Colorectal Cancer Screening Never done ---    COVID-19 Vaccine (1 - 2024-25 season) Never done ---    Influenza Vaccine (Season Ended) 09/01/2025 ---    Hemoglobin A1c (Diabetic Prevention Screening) 05/02/2027 5/2/2024    Lipid Panel 05/02/2029 5/2/2024    RSV Vaccine (Age 60+ and Pregnant patients) (1 - 1-dose 75+ series) 06/15/2051 ---          ASSESSMENT     49 y.o. male with     1. Primary hypertension    2. Frequent PVCs    3. Other insomnia    4. Class 1 obesity due to excess calories with serious comorbidity and body mass index (BMI) of 34.0 to 34.9 in adult    5. Paresthesia        PLAN:     1. Primary hypertension  Stable. Continue amlodipine 10 mg daily. Follow up in 4 weeks.  - Advised to maintain a low Na diet(<2g/day), exercise, and keep BP log to present to next visit   - amLODIPine (NORVASC) 5 MG tablet; Take 2 tablets (10 mg total) by mouth once daily.  Dispense: 60 tablet; Refill: 1    2. Frequent PVCs  Recurrent. Referred to Cardiology for further evaluation.   - Ambulatory referral/consult to Cardiology; Future    3. Other insomnia  Ongoing. Ordered hydroxyzine. Patient will try magnesium prior to trying hydroxyzine. Counseled patient on sleep hygiene.   Counseled patient that insomnia is repeated difficulty with sleep initiation, duration, consolidation or  "quality that occurs despite adequate time and opportunity for sleep, and results in some form of daytime impairment.  Chronic insomnia is at least 3 nights a week for greater than one month.  Discussed CBT, exercise program and adjunct therapies including antihistamine, hypnotic sedatives and antidepressants and their side effect profiles.  Counseled on sleep hygiene and avoidance of day time napping.   - hydrOXYzine pamoate (VISTARIL) 25 MG Cap; Take 1 capsule (25 mg total) by mouth nightly as needed (Insomnia and Anxiety).  Dispense: 30 capsule; Refill: 1    4. Class 1 obesity due to excess calories with serious comorbidity and body mass index (BMI) of 34.0 to 34.9 in adult  Behavioral counseling for obesity.  History:  - Patient's BMI: Estimated body mass index is 34.15 kg/m² as calculated from the following:    Height as of 5/19/25: 5' 9" (1.753 m).    Weight as of this encounter: 104.9 kg (231 lb 4.2 oz).  - Patient reports challenges related to weight management.  - Patient meets criteria for obesity counseling: BMI >= 30 kg/m² or >= 25 kg/m² with associated comorbidities.  - Discussion focused on the benefits of sustained weight loss and its impact on health outcomes.  Plan:  Dietary Guidance: Recommended caloric intake tailored to patient needs, emphasizing nutrient-dense, low-calorie foods. Discussed portion control and meal planning strategies.  Physical Activity: Encouraged moderate physical activity for at least 150 minutes per week, tailored to patients current physical abilities and goals.  Behavioral Strategies: Addressed behaviors contributing to weight gain and set actionable goals, including [food diary, mindful eating, avoiding late-night snacks, etc.]  Motivational Counseling: Discussed the importance of intrinsic and extrinsic motivators in achieving sustained weight management. Reinforced positive behavior changes.  Follow-Up Plan: Patient instructed to return for follow-up for progress " evaluation and continued counseling.  Time Spent: A total of 15 minutes was spent in face-to-face behavioral counseling focused on weight loss and obesity management.  Patient Understanding: Patient actively participated in the session and verbalized understanding of the discussed plan.   - Hemoglobin A1C; Future  - Lipid Panel; Future    5. Paresthesia  New. Addressing.   -Counseled patient on possible causes of paresthesia. Advised patient on when symptoms are occurring.     Assessment & Plan    HYPERTENSIVE:  - Reviewed recent hospitalization for severely elevated BP (197/132) recorded a week ago.  - Patient reports slight headaches and anxiety when blood pressure is high.  - Prescribed Amlodipine 10 mg daily and explained potential side effects including swelling, nausea, abdominal pain, lightheadedness, and flushing.  - Instructed patient to continue monitoring BP, keep records, and contact office if experiencing worsening side effects.  - Scheduled follow up in 4 weeks to assess medication efficacy.    VENTRICULAR PREMATURE DEPOLARIZATION:  - Monitored patient's reports of palpitations and PVCs occurring 20 times a minute, especially noticeable when relaxed.  - Noted change from previous normal EKG to current presence of premature ventricular complexes.  - Determined need for comprehensive cardiac workup.  - Referred to cardiology for evaluation including echocardiogram, stress test, and EF assessment.    INSOMNIA:  - Patient reports not sleeping well and waking up feeling unrested.  - Evaluated sleep issues, noting patient is borderline needing treatment, possibly related to anxiety or stress.  - Weight loss may also be a contributing factor.  - Recommend magnesium supplements (glycinate or L-threonate) for sleep improvement and explained differences between various types.  - Prescribed hydroxyzine 25 mg for sleep and anxiety management.  - Advised patient to avoid stimulants, including energy drinks and  excessive caffeine.    OVERWEIGHT:  - Current weight recorded at 231 lbs, down from 238 lbs a week ago.  - Discussed ongoing weight loss efforts, including calorie tracking and moderate exercise.  - Instructed patient to continue current dietary improvements.  - Ordered hemoglobin A1C.  - Recommend increasing vitamin D and calcium intake to support bone health.    PARESTHESIA:  - Patient reports tingling in feet and hands when elevated, possibly medication-related.  - Monitored patient's circulation issues, including coldness and tingling in extremities, possibly related to medication.  - Referred to Dr. Tripp for evaluation of these symptoms and potential medication changes.      Aleja Cartagena FNP [facility]  05/27/2025 11:29 AM    I spent a total of 30 minutes on the day of the visit.This includes face to face time and non-face to face time preparing to see the patient (eg, review of tests), obtaining and/or reviewing separately obtained history, documenting clinical information in the electronic or other health record, independently interpreting results and communicating results to the patient/family/caregiver, or care coordinator.       Follow up in about 4 weeks (around 6/24/2025) for Dr. Panchito Sims.    This note was generated with the assistance of ambient listening technology. Verbal consent was obtained by the patient and accompanying visitor(s) for the recording of patient appointment to facilitate this note. I attest to having reviewed and edited the generated note for accuracy, though some syntax or spelling errors may persist. Please contact the author of this note for any clarification.

## 2025-05-28 ENCOUNTER — LAB VISIT (OUTPATIENT)
Dept: LAB | Facility: HOSPITAL | Age: 50
End: 2025-05-28
Payer: MEDICAID

## 2025-05-28 ENCOUNTER — PATIENT MESSAGE (OUTPATIENT)
Dept: PRIMARY CARE CLINIC | Facility: CLINIC | Age: 50
End: 2025-05-28
Payer: MEDICAID

## 2025-05-28 DIAGNOSIS — E66.09 CLASS 1 OBESITY DUE TO EXCESS CALORIES WITH SERIOUS COMORBIDITY AND BODY MASS INDEX (BMI) OF 34.0 TO 34.9 IN ADULT: ICD-10-CM

## 2025-05-28 DIAGNOSIS — E66.811 CLASS 1 OBESITY DUE TO EXCESS CALORIES WITH SERIOUS COMORBIDITY AND BODY MASS INDEX (BMI) OF 34.0 TO 34.9 IN ADULT: ICD-10-CM

## 2025-05-28 LAB
CHOLEST SERPL-MCNC: 198 MG/DL (ref 120–199)
CHOLEST/HDLC SERPL: 6.2 {RATIO} (ref 2–5)
EAG (OHS): 111 MG/DL (ref 68–131)
HBA1C MFR BLD: 5.5 % (ref 4–5.6)
HDLC SERPL-MCNC: 32 MG/DL (ref 40–75)
HDLC SERPL: 16.2 % (ref 20–50)
LDLC SERPL CALC-MCNC: 127.8 MG/DL (ref 63–159)
NONHDLC SERPL-MCNC: 166 MG/DL
TRIGL SERPL-MCNC: 191 MG/DL (ref 30–150)

## 2025-05-28 PROCEDURE — 36415 COLL VENOUS BLD VENIPUNCTURE: CPT

## 2025-05-28 PROCEDURE — 83036 HEMOGLOBIN GLYCOSYLATED A1C: CPT

## 2025-05-28 PROCEDURE — 82465 ASSAY BLD/SERUM CHOLESTEROL: CPT

## 2025-05-28 RX ORDER — HYDROXYZINE PAMOATE 25 MG/1
25 CAPSULE ORAL NIGHTLY PRN
Qty: 30 CAPSULE | Refills: 1 | Status: SHIPPED | OUTPATIENT
Start: 2025-05-28

## 2025-05-29 ENCOUNTER — RESULTS FOLLOW-UP (OUTPATIENT)
Dept: PRIMARY CARE CLINIC | Facility: CLINIC | Age: 50
End: 2025-05-29

## 2025-05-30 ENCOUNTER — OFFICE VISIT (OUTPATIENT)
Dept: PRIMARY CARE CLINIC | Facility: CLINIC | Age: 50
End: 2025-05-30
Payer: MEDICAID

## 2025-05-30 VITALS
OXYGEN SATURATION: 95 % | BODY MASS INDEX: 34.16 KG/M2 | HEART RATE: 66 BPM | DIASTOLIC BLOOD PRESSURE: 86 MMHG | WEIGHT: 230.63 LBS | SYSTOLIC BLOOD PRESSURE: 136 MMHG | HEIGHT: 69 IN

## 2025-05-30 DIAGNOSIS — E66.811 CLASS 1 OBESITY DUE TO EXCESS CALORIES WITH SERIOUS COMORBIDITY AND BODY MASS INDEX (BMI) OF 34.0 TO 34.9 IN ADULT: ICD-10-CM

## 2025-05-30 DIAGNOSIS — E66.09 CLASS 1 OBESITY DUE TO EXCESS CALORIES WITH SERIOUS COMORBIDITY AND BODY MASS INDEX (BMI) OF 34.0 TO 34.9 IN ADULT: ICD-10-CM

## 2025-05-30 DIAGNOSIS — I10 PRIMARY HYPERTENSION: Primary | ICD-10-CM

## 2025-05-30 PROCEDURE — 99213 OFFICE O/P EST LOW 20 MIN: CPT | Mod: PBBFAC,PN

## 2025-05-30 PROCEDURE — 99999 PR PBB SHADOW E&M-EST. PATIENT-LVL III: CPT | Mod: PBBFAC,,,

## 2025-05-31 NOTE — PROGRESS NOTES
"Subjective:       Patient ID: Fredrick Cruz III is a 49 y.o. male.    Chief Complaint: htn and med management    HPI  Hypertension  Patient's BP today is 136/86. States BP at home is uncontrolled, systolic ranging in the 140s to 150s.  Patient was prescribed 10 mg of amlodipine, patient states he has been taking amlodipine 5 mg. He noticed that his blood pressure was usually elevated, that is when he increased his dosage to 10 mg amlodipine. He just started taking 10 mg 5 days ago.  He endorses occasional feelings of coldness on his lower extremities, along with mild feelings of tingling. This occurs occasionally.  Patient was previously prescribed losartan hydrochlorothiazide, patient did not take medication. He lost approximately 30-40 lb and his blood pressure was controlled. He states he gained back the weight and that is when he noticed that his blood pressure was uncontrolled again.    ROS negative except as above  Objective:      /86 (BP Location: Right arm, Patient Position: Sitting)   Pulse 66   Ht 5' 9" (1.753 m)   Wt 104.6 kg (230 lb 9.6 oz)   SpO2 95%   BMI 34.05 kg/m²    Physical Exam  Vitals reviewed.   Constitutional:       Appearance: Normal appearance. He is obese.   HENT:      Head: Normocephalic.      Mouth/Throat:      Mouth: Mucous membranes are moist.   Cardiovascular:      Rate and Rhythm: Normal rate and regular rhythm.      Pulses: Normal pulses.      Heart sounds: Normal heart sounds.   Pulmonary:      Effort: Pulmonary effort is normal.      Breath sounds: Normal breath sounds. No wheezing or rhonchi.   Abdominal:      General: Abdomen is flat. There is no distension.   Musculoskeletal:         General: No swelling. Normal range of motion.      Cervical back: Normal range of motion.   Skin:     General: Skin is warm.      Coloration: Skin is not jaundiced.   Neurological:      Mental Status: He is alert.         Assessment:       1. Primary hypertension    2. Class 1 " obesity due to excess calories with serious comorbidity and body mass index (BMI) of 34.0 to 34.9 in adult        Plan:       1. Primary hypertension (Primary)  Uncontrolled as per blood pressure log. He just started taking amlodipine 10 mg 5 days ago.  Continue taking amlodipine 10 mg, tolerating meds well, no side effects  Will not add any new medication today as he just started taking the prescribed dose 5 days ago  Patient to check BP at home daily and bring BP log next appt  Counseled on lifestyle changes ( low salt diet, exercise at least 150 mins/week, alcohol cessation)  Return to clinic in 1 month    2. Class 1 obesity due to excess calories with serious comorbidity and body mass index (BMI) of 34.0 to 34.9 in adult  Body mass index is 34.05 kg/m². , comorbidity includes hypertension  Recommended weight loss and improved diet along with exercise           Future Appointments   Date Time Provider Department Center   6/19/2025  3:20 PM Jonas Bravo MD [facility] C[facility] [place]   6/24/2025  1:20 PM Panchito Sims MD [facility] Farida Sims M.D.    I spent a total of 30 minutes on the day of the visit.This includes face to face time and non-face to face time preparing to see the patient (eg, review of tests), obtaining and/or reviewing separately obtained history, documenting clinical information in the electronic or other health record, independently interpreting results and communicating results to the patient/family/caregiver, or care coordinator.

## 2025-06-12 ENCOUNTER — PATIENT MESSAGE (OUTPATIENT)
Dept: PRIMARY CARE CLINIC | Facility: CLINIC | Age: 50
End: 2025-06-12
Payer: MEDICAID

## 2025-06-12 DIAGNOSIS — I10 PRIMARY HYPERTENSION: Primary | ICD-10-CM

## 2025-06-13 ENCOUNTER — PATIENT MESSAGE (OUTPATIENT)
Dept: PRIMARY CARE CLINIC | Facility: CLINIC | Age: 50
End: 2025-06-13
Payer: MEDICAID

## 2025-06-13 RX ORDER — ACETAMINOPHEN 500 MG
1 TABLET ORAL ONCE
Qty: 1 EACH | Refills: 0 | Status: SHIPPED | OUTPATIENT
Start: 2025-06-13 | End: 2025-06-14

## 2025-06-19 ENCOUNTER — OFFICE VISIT (OUTPATIENT)
Dept: CARDIOLOGY | Facility: CLINIC | Age: 50
End: 2025-06-19
Payer: MEDICAID

## 2025-06-19 ENCOUNTER — PATIENT MESSAGE (OUTPATIENT)
Dept: CARDIOLOGY | Facility: CLINIC | Age: 50
End: 2025-06-19

## 2025-06-19 VITALS
WEIGHT: 216.69 LBS | HEART RATE: 80 BPM | BODY MASS INDEX: 40.91 KG/M2 | DIASTOLIC BLOOD PRESSURE: 79 MMHG | SYSTOLIC BLOOD PRESSURE: 130 MMHG | HEIGHT: 61 IN | OXYGEN SATURATION: 99 %

## 2025-06-19 DIAGNOSIS — I49.3 FREQUENT PVCS: ICD-10-CM

## 2025-06-19 DIAGNOSIS — E78.5 HYPERLIPIDEMIA, UNSPECIFIED HYPERLIPIDEMIA TYPE: ICD-10-CM

## 2025-06-19 DIAGNOSIS — R00.2 PALPITATIONS: ICD-10-CM

## 2025-06-19 DIAGNOSIS — I10 HYPERTENSION, UNSPECIFIED TYPE: ICD-10-CM

## 2025-06-19 DIAGNOSIS — M79.89 BILATERAL SWELLING OF FEET: Primary | ICD-10-CM

## 2025-06-19 PROCEDURE — 93005 ELECTROCARDIOGRAM TRACING: CPT | Mod: PBBFAC,PN | Performed by: INTERNAL MEDICINE

## 2025-06-19 PROCEDURE — 99213 OFFICE O/P EST LOW 20 MIN: CPT | Mod: PBBFAC,PN | Performed by: INTERNAL MEDICINE

## 2025-06-19 PROCEDURE — 99999 PR PBB SHADOW E&M-EST. PATIENT-LVL III: CPT | Mod: PBBFAC,,, | Performed by: INTERNAL MEDICINE

## 2025-06-19 RX ORDER — ATENOLOL 25 MG/1
25 TABLET ORAL DAILY
Qty: 90 TABLET | Refills: 3 | Status: SHIPPED | OUTPATIENT
Start: 2025-06-19

## 2025-06-20 LAB
OHS QRS DURATION: 102 MS
OHS QTC CALCULATION: 430 MS

## 2025-06-20 NOTE — PROGRESS NOTES
FEMALE PELVIC MEDICINE  AND RECONSTRUCTIVE SURGERY  MD Srini Perez MD Abraham Shashoua, MD Alissa Schiller, PA-C      CHIEF COMPLAINT  Chief Complaint   Patient presents with   • Follow-up     Discuss OAB symptoms, urinary frequency and discuss possible botox injections.    • Pelvic Pain     X 1 month   • Urinary Frequency     X 1 month       REFERRING PROVIDER/PRIMARY CARE PROVIDER  Glenn Rdz,*    HISTORY OF PRESENT ILLNESS  I had the pleasure of seeing Ms. Atwood for follow up. She is a 56 year old     Who reports increasing urinary urgency/frequency with associated nocturia x2-3 over the last 1 year.  She states initially symptoms were well controlled with Myrbetriq.  She has had no further UTIs since starting vaginal estrogen therapy and also reports less vaginal dryness during intercourse.  She denies any significant bowel symptoms.      Past Medical History:   Diagnosis Date   • Hypercholesterolemia    • Lupus (CMS/HCC)    • Rheumatoid arthritis (CMS/HCC)        Past Surgical History:   Procedure Laterality Date   • Adenoidectomy     • Vaginal hysterectomy      total vaginal hysterectomy, Bauman culdoplasty, posterior repair and left oophorectomy       Social History     Tobacco Use   • Smoking status: Never Smoker   • Smokeless tobacco: Never Used   Substance Use Topics   • Alcohol use: Not Currently   • Drug use: Not Currently       Family History   Problem Relation Age of Onset   • Diabetes Father    • Cancer, Stomach Brother    • Cancer, Breast Maternal Grandmother        MEDICATIONS  Outpatient Medications Marked as Taking for the 22 encounter (Office Visit) with Srini Ruano MD   Medication Sig Dispense Refill   • mycophenolate (CELLCEPT) 250 MG capsule Take 1 capsule by mouth 2 times daily. 60 capsule 3   • estradiol (ESTRACE) 0.1 MG/GM vaginal cream Place 1 g vaginally 2 days a week. 42.5 g 3   • celecoxib (CeleBREX) 200 MG capsule Take 1 capsule by mouth  Subjective:   @Patient ID:  Fredrick Cruz III is a 50 y.o. male who presents for evaluation of No chief complaint on file.      HPI:       50-year-old male, here for evaluation of palpitations.  Has had history of PVCs in the past.  Active medical problems include          -hypertension on amlodipine.  Stopped today.  -bilateral swelling of the feet, amlodipine stopped today  -hyperlipidemia LDL down with weight loss  -BMI 40  -no significant arrhythmias on Holter monitor except for rare ectopy  -TSH normal in 2022  -euvolemic on examination          Please document below the medical necessity for continuous telemetry monitoring or discontinue the current order if appropriate.    Current rhythm from flowsheet:               Patient Active Problem List    Diagnosis Date Noted    Primary hypertension 11/03/2022    Palpitations 11/03/2022    At risk for sleep apnea 11/03/2022    Class 1 obesity due to excess calories with serious comorbidity and body mass index (BMI) of 34.0 to 34.9 in adult 01/21/2022    Hyperglycemia 01/21/2022    Cellulitis of right upper extremity 01/20/2022                    LAST HbA1c  Lab Results   Component Value Date    HGBA1C 5.5 05/28/2025       Lipid panel  Lab Results   Component Value Date    CHOL 198 05/28/2025    CHOL 209 (H) 05/02/2024    CHOL 254 (H) 07/09/2008     Lab Results   Component Value Date    HDL 32 (L) 05/28/2025    HDL 29 (L) 05/02/2024    HDL 27 (L) 07/09/2008     Lab Results   Component Value Date    LDLCALC 127.8 05/28/2025    LDLCALC 129.8 05/02/2024    LDLCALC 168.6 (H) 07/09/2008     Lab Results   Component Value Date    TRIG 191 (H) 05/28/2025    TRIG 251 (H) 05/02/2024    TRIG 292 (H) 07/09/2008     Lab Results   Component Value Date    CHOLHDL 16.2 (L) 05/28/2025    CHOLHDL 13.9 (L) 05/02/2024    CHOLHDL 10.6 (L) 07/09/2008            Review of Systems   Constitutional: Negative for chills and fever.   HENT:  Negative for hearing loss and nosebleeds.     Eyes:  Negative for blurred vision.   Cardiovascular:         As in HPI    Respiratory:  Negative for cough, hemoptysis and shortness of breath.    Endocrine: Negative for cold intolerance and polyuria.   Hematologic/Lymphatic: Negative for bleeding problem.   Skin:  Negative for itching.   Musculoskeletal:  Negative for falls.   Gastrointestinal:  Negative for abdominal pain and hematochezia.   Genitourinary:  Negative for hematuria.   Neurological:  Negative for dizziness and loss of balance.   Psychiatric/Behavioral:  Negative for altered mental status and depression.        Objective:   Physical Exam  Constitutional:       Appearance: He is well-developed.   HENT:      Head: Normocephalic and atraumatic.   Eyes:      Conjunctiva/sclera: Conjunctivae normal.   Neck:      Vascular: No carotid bruit or JVD.   Cardiovascular:      Rate and Rhythm: Normal rate and regular rhythm.      Pulses:           Carotid pulses are 2+ on the right side and 2+ on the left side.       Radial pulses are 2+ on the right side and 2+ on the left side.      Heart sounds: Murmur heard.      No friction rub. No gallop.   Pulmonary:      Effort: Pulmonary effort is normal. No respiratory distress.      Breath sounds: Normal breath sounds. No stridor. No wheezing.   Abdominal:      General: Abdomen is flat.      Palpations: Abdomen is soft.   Musculoskeletal:      Cervical back: Neck supple.      Right lower leg: No edema.      Left lower leg: No edema.   Skin:     General: Skin is warm and dry.   Neurological:      Mental Status: He is alert and oriented to person, place, and time.   Psychiatric:         Behavior: Behavior normal.         Assessment:     1. Bilateral swelling of feet    2. Frequent PVCs    3. Hypertension, unspecified type    4. Palpitations    5. Hyperlipidemia, unspecified hyperlipidemia type        Plan:       -no exertional symptoms.  We discussed switching from amlodipine to atenolol which will take care of  every 12 hours. 180 capsule 2   • Restasis MultiDose 0.05 % ophthalmic emulsion Location Both Eyes. 1 drop 2 times a day     • erythromycin (ILOTYCIN) ophthalmic ointment Location Both Eyes. Apply a thin ribbon to both eyes every night at bedtime     • pantoprazole (PROTONIX) 40 MG tablet pantoprazole 40 mg tablet,delayed release   TAKE 1 TABLET EVERY DAY BY ORAL ROUTE in the morning     • rosuvastatin (CRESTOR) 5 MG tablet rosuvastatin 5 mg tablet     • zonisamide (ZONEGRAN) 25 MG capsule zonisamide 25 mg capsule     • zoster vaccine recomb adjuvanted (Shingrix) 50 MCG/0.5ML injection Shingrix (PF) 50 mcg/0.5 mL intramuscular suspension, kit   ADMINISTER PER PROTOCOL     • mirabegron ER (Myrbetriq) 50 MG 24 hr tablet Take 1 tablet by mouth daily. 90 tablet 3   • calcium citrate-vitamin D (CITRACAL+D) 315-250 MG-UNIT per tablet every 12 hours.       Medications were reviewed and updated today.    REVIEW OF SYSTEMS  ROS    PHYSICAL EXAM  Visit Vitals  /88 (BP Location: LUE - Left upper extremity, Patient Position: Sitting, Cuff Size: Regular)   Pulse 80   Temp 97.6 °F (36.4 °C) (Skin)   Resp 16   Ht 5' 2\" (1.575 m)   Wt 68 kg (150 lb)   BMI 27.44 kg/m²       Constitutional: General Appearance: in no acute distress and current vital signs reviewed.   Head and Face: Atraumatic, no deformities, norm cephalic and normal facies.  Eyes: Inspection of Conjunctiva and Lids: no discharge, no eyelid swelling and no ptosis.  ENT: External Inspection of Ears & Nose: normal appearing outer ear and normal appearing nose.   Neck: Normal appearing neck and no mass was seen.  Lymphatic: Palpation of Lymph Nodes: no lymphadenopathy.      Pulmonary: no respiratory distress, normal respiratory rate and effort and no accessory muscle use.    Abdomen: Soft, nontender and nondistended.  Gastrointestinal: Normal anus.  External Genitalia: No abnormalities and no lesions.  Vagina: second degree cyctocele and pelvic floor muscle  strength 2/5  Vaginal Discharge: No  Urethra: normal   Bladder: Normal. Urinary catheterization performed today after procedure explained to patient.  Patient prepped with antiseptic swabs and a 12 Swedish catheter was inserted without difficulty.  50 mls of urine was drained from the bladder.  The catheter was removed and urine specimen sent to lab. Patient tolerated procedure without complication.   Cervix: absent (s/p hysterectomy)  Uterus: The uterus was surgically absent  Adnexa: Non-tender and not enlarged.  Neurological: Oriented to person, oriented to place and oriented to time.   Skin: Normal, no lesions, rashes    ASSESSMENT/PLAN  1. OAB (overactive bladder)    2. Genitourinary syndrome of menopause    3. Urge incontinence of urine    4. Recurrent UTI      I again had an extensive discussion with her regarding the diagnosis and management options for her overactive bladder/urge incontinence symptoms that have recurred despite continued treatment with Myrbetriq. I reviewed treatment options including weight loss, alternate medical therapy, bladder training, a bladder diet, pelvic muscle exercises, intravesical Botox injections and neuromodulation.  Patient interested in Botox injections and we reviewed need for repeat injections every 6 to 12 months and 5% risk of urinary retention requiring self-catheterization following injections.  She will follow-up in the near future for Botox injections.    We also reviewed vaginal estrogen therapy for her vaginal atrophy and recurrent UTI symptoms and she would like to continue vaginal estrogen therapy.      Total visit time spent with the patient was 20 minutes with greater than 50% of the total time spent on counseling and/or coordinating care, visit preparation and reviewing records   swelling of the feet and also help with intermittent palpitations.  -Holter monitor and echocardiogram for further evaluation  -EKG today shows rare PVC.  -follow up with test results.    Pertinent cardiac images and EKG reviewed independently.    Continue with current medical plan and lifestyle changes.  Return sooner for concerns or questions. If symptoms persist go to the ED  I have reviewed all pertinent data including patient's medical history in detail and updated the computerized patient record.     (Portions of this note were dictated using voice recognition software and may contain dictation related errors in spelling/grammar/syntax not found on text review)    Orders Placed This Encounter   Procedures    Exercise Stress - EKG     Standing Status:   Future     Expiration Date:   6/19/2026     Release to patient:   Immediate    Holter monitor - 48 hour     Standing Status:   Future     Expiration Date:   6/19/2026     Release to patient:   Immediate    IN OFFICE EKG 12-LEAD (to [place])    IN OFFICE EKG 12-LEAD (to [place])    Echo     Standing Status:   Future     Expiration Date:   6/19/2026     Release to patient:   Immediate       Follow up as scheduled.     He expressed verbal understanding and agreed with the plan    Patient's Medications   New Prescriptions    ATENOLOL (TENORMIN) 25 MG TABLET    Take 1 tablet (25 mg total) by mouth once daily.   Previous Medications    HYDROXYZINE PAMOATE (VISTARIL) 25 MG CAP    Take 1 capsule (25 mg total) by mouth nightly as needed (Insomnia and Anxiety).   Modified Medications    No medications on file   Discontinued Medications    AMLODIPINE (NORVASC) 5 MG TABLET    Take 2 tablets (10 mg total) by mouth once daily.        Jonas Bravo M.D

## 2025-06-23 ENCOUNTER — HOSPITAL ENCOUNTER (OUTPATIENT)
Dept: CARDIOLOGY | Facility: HOSPITAL | Age: 50
Discharge: HOME | End: 2025-06-23
Attending: INTERNAL MEDICINE
Payer: MEDICAID

## 2025-06-23 DIAGNOSIS — I49.3 FREQUENT PVCS: ICD-10-CM

## 2025-06-23 LAB
CV STRESS BASE HR: 57 BPM
DIASTOLIC BLOOD PRESSURE: 84 MMHG
OHS CV CPX 1 MINUTE RECOVERY HEART RATE: 99 BPM
OHS CV CPX 85 PERCENT MAX PREDICTED HEART RATE MALE: 145
OHS CV CPX ESTIMATED METS: 14
OHS CV CPX MAX PREDICTED HEART RATE: 171
OHS CV CPX PATIENT IS FEMALE: 0
OHS CV CPX PATIENT IS MALE: 1
OHS CV CPX PEAK DIASTOLIC BLOOD PRESSURE: 86 MMHG
OHS CV CPX PEAK HEAR RATE: 129 BPM
OHS CV CPX PEAK RATE PRESSURE PRODUCT: NORMAL
OHS CV CPX PEAK SYSTOLIC BLOOD PRESSURE: 181 MMHG
OHS CV CPX PERCENT MAX PREDICTED HEART RATE ACHIEVED: 75
OHS CV CPX RATE PRESSURE PRODUCT PRESENTING: 6213
STRESS ECHO POST EXERCISE DUR MIN: 12 MINUTES
STRESS ECHO POST EXERCISE DUR SEC: 1 SECONDS
SYSTOLIC BLOOD PRESSURE: 109 MMHG

## 2025-06-23 PROCEDURE — 93017 CV STRESS TEST TRACING ONLY: CPT

## 2025-06-23 PROCEDURE — 93016 CV STRESS TEST SUPVJ ONLY: CPT | Mod: ,,, | Performed by: INTERNAL MEDICINE

## 2025-06-23 PROCEDURE — 93018 CV STRESS TEST I&R ONLY: CPT | Mod: ,,, | Performed by: INTERNAL MEDICINE

## 2025-06-24 ENCOUNTER — OFFICE VISIT (OUTPATIENT)
Dept: PRIMARY CARE CLINIC | Facility: CLINIC | Age: 50
End: 2025-06-24
Payer: MEDICAID

## 2025-06-24 VITALS
HEART RATE: 58 BPM | SYSTOLIC BLOOD PRESSURE: 114 MMHG | BODY MASS INDEX: 40.8 KG/M2 | HEIGHT: 61 IN | WEIGHT: 216.13 LBS | DIASTOLIC BLOOD PRESSURE: 72 MMHG | OXYGEN SATURATION: 98 %

## 2025-06-24 DIAGNOSIS — M54.6 CHRONIC MIDLINE THORACIC BACK PAIN: ICD-10-CM

## 2025-06-24 DIAGNOSIS — I10 PRIMARY HYPERTENSION: Primary | ICD-10-CM

## 2025-06-24 DIAGNOSIS — G89.29 CHRONIC MIDLINE THORACIC BACK PAIN: ICD-10-CM

## 2025-06-24 PROCEDURE — 99214 OFFICE O/P EST MOD 30 MIN: CPT | Mod: S$PBB,,,

## 2025-06-24 PROCEDURE — 1160F RVW MEDS BY RX/DR IN RCRD: CPT | Mod: CPTII,,,

## 2025-06-24 PROCEDURE — 99999 PR PBB SHADOW E&M-EST. PATIENT-LVL IV: CPT | Mod: PBBFAC,,,

## 2025-06-24 PROCEDURE — 3074F SYST BP LT 130 MM HG: CPT | Mod: CPTII,,,

## 2025-06-24 PROCEDURE — 1159F MED LIST DOCD IN RCRD: CPT | Mod: CPTII,,,

## 2025-06-24 PROCEDURE — 99214 OFFICE O/P EST MOD 30 MIN: CPT | Mod: PBBFAC,PN

## 2025-06-24 PROCEDURE — 3044F HG A1C LEVEL LT 7.0%: CPT | Mod: CPTII,,,

## 2025-06-24 PROCEDURE — 3008F BODY MASS INDEX DOCD: CPT | Mod: CPTII,,,

## 2025-06-24 PROCEDURE — G2211 COMPLEX E/M VISIT ADD ON: HCPCS | Mod: ,,,

## 2025-06-24 PROCEDURE — 3078F DIAST BP <80 MM HG: CPT | Mod: CPTII,,,

## 2025-06-29 NOTE — PROGRESS NOTES
"Subjective:       Patient ID: Fredrick Cruz III is a 50 y.o. male.    Chief Complaint: Follow-up    HPI  Hypertension  Patient's BP today is 14/72. States BP at home is well-controlled, is taking atenolol 25 mg daily, is compliant with medications.   He was previously taking amlodipine but this was stopped and started atenolol due to palpitations.  Patient will be getting a Holter monitor soon    Patient complains of back pain, this has been going on for couple of years, mild intensity. Patient has an active lifestyle. He is concerned about rucking, currently walks with 10 lb on his back but is interested in increasing the weight.    ROS negative except as above  Objective:      /72 (BP Location: Right arm, Patient Position: Sitting)   Pulse (!) 58   Ht 5' 1" (1.549 m)   Wt 98 kg (216 lb 1.6 oz)   SpO2 98%   BMI 40.83 kg/m²    Physical Exam  Vitals reviewed.   Constitutional:       Appearance: Normal appearance.   HENT:      Head: Normocephalic.      Mouth/Throat:      Mouth: Mucous membranes are moist.   Cardiovascular:      Rate and Rhythm: Normal rate and regular rhythm.      Pulses: Normal pulses.      Heart sounds: Normal heart sounds.   Pulmonary:      Effort: Pulmonary effort is normal.      Breath sounds: Normal breath sounds. No wheezing or rhonchi.   Abdominal:      General: Abdomen is flat. There is no distension.   Musculoskeletal:         General: No swelling. Normal range of motion.      Cervical back: Normal range of motion.      Comments: Back is Nontender on examination   Skin:     General: Skin is warm.      Coloration: Skin is not jaundiced.   Neurological:      Mental Status: He is alert.         Assessment:       1. Primary hypertension    2. Chronic midline thoracic back pain        Plan:       1. Primary hypertension (Primary)  Well controlled  Continue taking atenolol 25 mg, tolerating meds well, no side effects  He was previously taking amlodipine but this was stopped and " started atenolol due to palpitations.  Patient will be getting a Holter monitor soon  Patient to check BP at home daily and bring BP log next appt  Counseled on lifestyle changes ( low salt diet, exercise at least 150 mins/week, alcohol cessation)      2. Chronic midline thoracic back pain  He is concerned about rucking, currently walks with 10 lb on his back but is interested in increasing the weight.  We will get x-rays to further evaluate this condition, patient states this condition improves whenever he exercises.  - X-Ray Thoracic Spine AP Lateral; Future  - X-Ray Lumbar Spine Ap And Lateral; Future          Follow up in about 3 months (around 9/24/2025) for htn f/u, blood pressure log.      Panchito Sims M.D.    I spent a total of 30 minutes on the day of the visit.This includes face to face time and non-face to face time preparing to see the patient (eg, review of tests), obtaining and/or reviewing separately obtained history, documenting clinical information in the electronic or other health record, independently interpreting results and communicating results to the patient/family/caregiver, or care coordinator.

## 2025-07-01 ENCOUNTER — HOSPITAL ENCOUNTER (OUTPATIENT)
Dept: CARDIOLOGY | Facility: HOSPITAL | Age: 50
Discharge: HOME | End: 2025-07-01
Attending: INTERNAL MEDICINE
Payer: MEDICAID

## 2025-07-01 VITALS
DIASTOLIC BLOOD PRESSURE: 79 MMHG | SYSTOLIC BLOOD PRESSURE: 131 MMHG | HEIGHT: 69 IN | HEART RATE: 60 BPM | BODY MASS INDEX: 31.99 KG/M2 | WEIGHT: 216 LBS

## 2025-07-01 DIAGNOSIS — I49.3 FREQUENT PVCS: ICD-10-CM

## 2025-07-01 PROCEDURE — 93306 TTE W/DOPPLER COMPLETE: CPT | Mod: 26,,, | Performed by: STUDENT IN AN ORGANIZED HEALTH CARE EDUCATION/TRAINING PROGRAM

## 2025-07-01 PROCEDURE — 93306 TTE W/DOPPLER COMPLETE: CPT

## 2025-07-02 LAB
AORTIC SIZE INDEX (SOV): 1.6 CM/M2
AORTIC SIZE INDEX: 1.5 CM/M2
ASCENDING AORTA: 3.2 CM
AV AREA BY CONTINUOUS VTI: 2.9 CM2
AV INDEX (PROSTH): 0.71
AV LVOT MEAN GRADIENT: 3 MMHG
AV LVOT PEAK GRADIENT: 5 MMHG
AV MEAN GRADIENT: 5 MMHG
AV PEAK GRADIENT: 9 MMHG
AV VALVE AREA BY VELOCITY RATIO: 3.3 CM²
AV VALVE AREA: 3 CM2
AV VELOCITY RATIO: 0.8
BSA FOR ECHO PROCEDURE: 2.18 M2
CV ECHO LV RWT: 0.37 CM
DOP CALC AO PEAK VEL: 1.5 M/S
DOP CALC AO VTI: 35.2 CM
DOP CALC LVOT AREA: 4.2 CM2
DOP CALC LVOT DIAMETER: 2.3 CM
DOP CALC LVOT PEAK VEL: 1.2 M/S
DOP CALC LVOT STROKE VOLUME: 104.2 CM3
DOP CALCLVOT PEAK VEL VTI: 25.1 CM
E WAVE DECELERATION TIME: 210 MS
E/A RATIO: 0.9
E/E' RATIO: 9 M/S
ECHO EF ESTIMATED: 70 %
ECHO LV POSTERIOR WALL: 1 CM (ref 0.6–1.1)
FRACTIONAL SHORTENING: 40.7 % (ref 28–44)
INTERVENTRICULAR SEPTUM: 0.8 CM (ref 0.6–1.1)
IVC DIAMETER: 2.16 CM
LA MAJOR: 5.8 CM
LA MINOR: 5.6 CM
LA WIDTH: 3.6 CM
LEFT ATRIUM SIZE: 4.3 CM
LEFT ATRIUM VOLUME INDEX MOD: 31 ML/M2
LEFT ATRIUM VOLUME INDEX: 35 ML/M2
LEFT ATRIUM VOLUME MOD: 67 ML
LEFT ATRIUM VOLUME: 75 CM3
LEFT INTERNAL DIMENSION IN SYSTOLE: 3.2 CM (ref 2.1–4)
LEFT VENTRICLE DIASTOLIC VOLUME INDEX: 64.79 ML/M2
LEFT VENTRICLE DIASTOLIC VOLUME: 138 ML
LEFT VENTRICLE MASS INDEX: 84.6 G/M2
LEFT VENTRICLE SYSTOLIC VOLUME INDEX: 19.7 ML/M2
LEFT VENTRICLE SYSTOLIC VOLUME: 42 ML
LEFT VENTRICULAR INTERNAL DIMENSION IN DIASTOLE: 5.4 CM (ref 3.5–6)
LEFT VENTRICULAR MASS: 180.1 G
LV LATERAL E/E' RATIO: 7.3
LV SEPTAL E/E' RATIO: 12.4
MV A" WAVE DURATION": 131.3 MS
MV PEAK A VEL: 0.97 M/S
MV PEAK E VEL: 0.87 M/S
OHS CV RV/LV RATIO: 0.8 CM
OHS LV EJECTION FRACTION SIMPSONS BIPLANE MOD: 56 %
PISA TR MAX VEL: 1.8 M/S
PULM VEIN A" WAVE DURATION": 131.3 MS
PULM VEIN S/D RATIO: 1.09
PULMONIC VEIN PEAK A VELOCITY: 0.3 M/S
PV PEAK D VEL: 0.58 M/S
PV PEAK S VEL: 0.63 M/S
RA MAJOR: 5.16 CM
RA PRESSURE ESTIMATED: 8 MMHG
RA WIDTH: 3.58 CM
RIGHT ATRIAL AREA: 19.4 CM2
RIGHT VENTRICLE DIASTOLIC BASEL DIMENSION: 4.3 CM
RV TB RVSP: 10 MMHG
RV TISSUE DOPPLER FREE WALL SYSTOLIC VELOCITY 1 (APICAL 4 CHAMBER VIEW): 11.67 CM/S
SINUS: 3.4 CM
STJ: 3 CM
TDI LATERAL: 0.12 M/S
TDI SEPTAL: 0.07 M/S
TDI: 0.1 M/S
TRICUSPID ANNULAR PLANE SYSTOLIC EXCURSION: 2.5 CM
TV PEAK GRADIENT: 13 MMHG
TV REST PULMONARY ARTERY PRESSURE: 21 MMHG
Z-SCORE OF LEFT VENTRICULAR DIMENSION IN END DIASTOLE: -2.29
Z-SCORE OF LEFT VENTRICULAR DIMENSION IN END SYSTOLE: -2.04

## 2025-07-11 ENCOUNTER — PATIENT MESSAGE (OUTPATIENT)
Dept: CARDIOLOGY | Facility: CLINIC | Age: 50
End: 2025-07-11
Payer: MEDICAID

## 2025-07-11 ENCOUNTER — PATIENT MESSAGE (OUTPATIENT)
Dept: PRIMARY CARE CLINIC | Facility: CLINIC | Age: 50
End: 2025-07-11
Payer: MEDICAID

## 2025-07-14 RX ORDER — ATENOLOL 25 MG/1
25 TABLET ORAL DAILY
Qty: 90 TABLET | Refills: 3 | Status: SHIPPED | OUTPATIENT
Start: 2025-07-14

## 2025-07-14 NOTE — TELEPHONE ENCOUNTER
6tabs of atenolol lft   Returns on the 29th  Requesting enough for the rest of the month if possible   Will be relocating by 07/15/2025 pm or 7/16/2025 am    Sent in to [facility] on [address] , [place] [address]

## 2025-08-12 ENCOUNTER — HOSPITAL ENCOUNTER (OUTPATIENT)
Dept: CARDIOLOGY | Facility: HOSPITAL | Age: 50
Discharge: HOME | End: 2025-08-12
Attending: INTERNAL MEDICINE
Payer: MEDICAID

## 2025-08-12 DIAGNOSIS — I49.3 FREQUENT PVCS: ICD-10-CM

## 2025-08-12 PROCEDURE — 93226 XTRNL ECG REC<48 HR SCAN A/R: CPT

## 2025-08-15 LAB
OHS CV EVENT MONITOR DAY: 0
OHS CV HOLTER LENGTH DECIMAL HOURS: 47.98
OHS CV HOLTER LENGTH HOURS: 47
OHS CV HOLTER LENGTH MINUTES: 59
OHS CV HOLTER SINUS AVERAGE HR: 66
OHS CV HOLTER SINUS MAX HR: 118
OHS CV HOLTER SINUS MIN HR: 44

## 2025-08-28 ENCOUNTER — OFFICE VISIT (OUTPATIENT)
Dept: CARDIOLOGY | Facility: CLINIC | Age: 50
End: 2025-08-28
Payer: MEDICAID

## 2025-08-28 DIAGNOSIS — I10 PRIMARY HYPERTENSION: ICD-10-CM

## 2025-08-28 DIAGNOSIS — R00.2 PALPITATIONS: ICD-10-CM

## 2025-08-28 DIAGNOSIS — M79.89 BILATERAL SWELLING OF FEET: ICD-10-CM

## 2025-08-28 DIAGNOSIS — I49.3 FREQUENT PVCS: Primary | ICD-10-CM

## 2025-08-28 PROCEDURE — 98006 SYNCH AUDIO-VIDEO EST MOD 30: CPT | Mod: 95,,, | Performed by: INTERNAL MEDICINE

## 2025-08-28 PROCEDURE — G2211 COMPLEX E/M VISIT ADD ON: HCPCS | Mod: 95,,, | Performed by: INTERNAL MEDICINE

## 2025-08-28 PROCEDURE — 3044F HG A1C LEVEL LT 7.0%: CPT | Mod: CPTII,95,, | Performed by: INTERNAL MEDICINE

## 2025-08-28 RX ORDER — ATENOLOL 25 MG/1
25 TABLET ORAL DAILY
Qty: 90 TABLET | Refills: 3 | Status: SHIPPED | OUTPATIENT
Start: 2025-08-28 | End: 2026-08-28

## 2025-09-04 ENCOUNTER — PATIENT MESSAGE (OUTPATIENT)
Dept: CARDIOLOGY | Facility: CLINIC | Age: 50
End: 2025-09-04
Payer: MEDICAID